# Patient Record
Sex: MALE | Race: WHITE | NOT HISPANIC OR LATINO | Employment: FULL TIME | URBAN - METROPOLITAN AREA
[De-identification: names, ages, dates, MRNs, and addresses within clinical notes are randomized per-mention and may not be internally consistent; named-entity substitution may affect disease eponyms.]

---

## 2017-03-15 ENCOUNTER — HOSPITAL ENCOUNTER (OUTPATIENT)
Dept: INFUSION THERAPY | Facility: HOSPITAL | Age: 52
Discharge: HOME | End: 2017-03-15
Attending: EMERGENCY MEDICINE | Admitting: EMERGENCY MEDICINE

## 2018-03-07 ENCOUNTER — HOSPITAL ENCOUNTER (OUTPATIENT)
Dept: ONCOLOGY | Facility: CLINIC | Age: 53
Setting detail: INFUSION SERIES
Discharge: HOME | End: 2018-03-07
Attending: INTERNAL MEDICINE | Admitting: INTERNAL MEDICINE

## 2018-03-07 ENCOUNTER — CLINICAL SUPPORT (OUTPATIENT)
Dept: ONCOLOGY | Facility: HOSPITAL | Age: 53
End: 2018-03-07

## 2018-03-07 NOTE — PROGRESS NOTES
PATIENTS ONCOLOGY RECORD LOCATED IN [facility]      Subjective     Name:  BUSTER ALARCON     Date:  2018  Address:  [address] [place] [address]  Home: [phone]  :  1965 AGE:  52 y.o.        RECORDS OBTAINED:  Patients Oncology Record [facility] located [facility]

## 2018-06-13 ENCOUNTER — HOSPITAL ENCOUNTER (OUTPATIENT)
Dept: ONCOLOGY | Facility: CLINIC | Age: 53
Setting detail: INFUSION SERIES
Discharge: HOME | End: 2018-06-13
Attending: INTERNAL MEDICINE | Admitting: INTERNAL MEDICINE

## 2018-06-13 ENCOUNTER — CLINICAL SUPPORT (OUTPATIENT)
Dept: ONCOLOGY | Facility: HOSPITAL | Age: 53
End: 2018-06-13

## 2018-06-13 NOTE — PROGRESS NOTES
PATIENTS ONCOLOGY RECORD LOCATED IN [facility]      Subjective     Name:  BUSTER ALARCON     Date:  2018  Address:  [address] [place] [address]  Home: [phone]  :  1965 AGE:  53 y.o.        RECORDS OBTAINED:  Patients Oncology Record is located in [facility]

## 2018-09-13 ENCOUNTER — HOSPITAL ENCOUNTER (OUTPATIENT)
Dept: NUCLEAR MEDICINE | Facility: HOSPITAL | Age: 53
Discharge: HOME | End: 2018-09-13
Attending: INTERNAL MEDICINE | Admitting: INTERNAL MEDICINE

## 2018-10-03 ENCOUNTER — OUTSIDE FACILITY SERVICE (OUTPATIENT)
Dept: CARDIAC SURGERY | Facility: CLINIC | Age: 53
End: 2018-10-03

## 2018-10-03 PROCEDURE — 33533 CABG ARTERIAL SINGLE: CPT | Performed by: THORACIC SURGERY (CARDIOTHORACIC VASCULAR SURGERY)

## 2018-10-03 PROCEDURE — 33508 ENDOSCOPIC VEIN HARVEST: CPT | Performed by: THORACIC SURGERY (CARDIOTHORACIC VASCULAR SURGERY)

## 2018-10-03 PROCEDURE — 33508 ENDOSCOPIC VEIN HARVEST: CPT | Performed by: PHYSICIAN ASSISTANT

## 2018-10-03 PROCEDURE — 33519 CABG ARTERY-VEIN THREE: CPT | Performed by: PHYSICIAN ASSISTANT

## 2018-10-03 PROCEDURE — 33519 CABG ARTERY-VEIN THREE: CPT | Performed by: THORACIC SURGERY (CARDIOTHORACIC VASCULAR SURGERY)

## 2018-10-03 PROCEDURE — 33533 CABG ARTERIAL SINGLE: CPT | Performed by: PHYSICIAN ASSISTANT

## 2018-10-17 ENCOUNTER — TELEPHONE (OUTPATIENT)
Dept: CARDIAC SURGERY | Facility: CLINIC | Age: 53
End: 2018-10-17

## 2018-10-17 NOTE — TELEPHONE ENCOUNTER
"Pt called asking about what his hgb/hct is--those numbers relayed to him.  He is concerned that he is \"cold.\"  We discussed the season change.  He denies any c/o fever, chills, and malaise.    He wants to make sure he should be dressing himself and showering himself.  He has still been sleeping in the chair.  We discussed ADLs and how to get from lying to sitting to standing position with sternal precautions.    He has seen Dr. Hamlin and states that Dr. Hamlin said cardiac rehab in 30 days.    He has an appt on 11/12/2018.  "

## 2018-10-17 NOTE — TELEPHONE ENCOUNTER
Pt called and states that he had a CABG procedure done 10/3/18 and has some general questions that he wanted to speak to the ARNP about. He says he has appeared to be more pale, feeling more cold, more weight loss and has some other questions for you. He would like a call back to discuss his concerns. I told pt I would have the nurse give him a call back. Please let me know when you speak with him. Thanks!

## 2018-10-29 ENCOUNTER — TELEPHONE (OUTPATIENT)
Dept: CARDIAC SURGERY | Facility: CLINIC | Age: 53
End: 2018-10-29

## 2018-11-12 ENCOUNTER — OFFICE VISIT (OUTPATIENT)
Dept: CARDIAC SURGERY | Facility: CLINIC | Age: 53
End: 2018-11-12

## 2018-11-12 VITALS
DIASTOLIC BLOOD PRESSURE: 80 MMHG | BODY MASS INDEX: 24.19 KG/M2 | SYSTOLIC BLOOD PRESSURE: 123 MMHG | TEMPERATURE: 97.5 F | OXYGEN SATURATION: 98 % | RESPIRATION RATE: 20 BRPM | HEIGHT: 72 IN | HEART RATE: 60 BPM | WEIGHT: 178.6 LBS

## 2018-11-12 DIAGNOSIS — Z09 FOLLOW UP: Primary | ICD-10-CM

## 2018-11-12 PROCEDURE — 99024 POSTOP FOLLOW-UP VISIT: CPT | Performed by: NURSE PRACTITIONER

## 2018-11-12 RX ORDER — BUMETANIDE 1 MG/1
1 TABLET ORAL DAILY
Refills: 0 | COMMUNITY
Start: 2018-10-07 | End: 2018-11-12

## 2018-11-12 RX ORDER — NITROGLYCERIN 0.4 MG/1
TABLET SUBLINGUAL
Refills: 0 | COMMUNITY
Start: 2018-09-20 | End: 2018-11-12

## 2018-11-12 RX ORDER — CLOPIDOGREL BISULFATE 75 MG/1
75 TABLET ORAL DAILY
Refills: 1 | COMMUNITY
Start: 2018-11-05 | End: 2019-08-05 | Stop reason: SDUPTHER

## 2018-11-12 RX ORDER — ATORVASTATIN CALCIUM 10 MG/1
10 TABLET, FILM COATED ORAL
Refills: 3 | COMMUNITY
Start: 2018-10-30 | End: 2019-10-01

## 2018-11-12 RX ORDER — UBIDECARENONE 100 MG
100 CAPSULE ORAL DAILY
COMMUNITY
End: 2020-04-22

## 2018-11-12 RX ORDER — RANITIDINE 150 MG/1
TABLET ORAL
Refills: 0 | COMMUNITY
Start: 2018-10-02 | End: 2018-11-12 | Stop reason: ALTCHOICE

## 2018-11-12 RX ORDER — AMIODARONE HYDROCHLORIDE 200 MG/1
TABLET ORAL
Refills: 1 | COMMUNITY
Start: 2018-11-05 | End: 2019-06-17

## 2018-11-12 RX ORDER — TESTOSTERONE CYPIONATE 100 MG/ML
1 INJECTION, SOLUTION INTRAMUSCULAR
COMMUNITY
End: 2018-11-12 | Stop reason: ALTCHOICE

## 2018-11-12 RX ORDER — POTASSIUM CHLORIDE 750 MG/1
10 CAPSULE, EXTENDED RELEASE ORAL
COMMUNITY
End: 2018-11-12

## 2018-11-12 RX ORDER — ASPIRIN 325 MG
81 TABLET ORAL
COMMUNITY
End: 2018-11-12 | Stop reason: ALTCHOICE

## 2018-11-12 RX ORDER — VALSARTAN 320 MG/1
TABLET ORAL
COMMUNITY
Start: 2018-09-25 | End: 2018-11-12 | Stop reason: ALTCHOICE

## 2018-11-12 RX ORDER — TELMISARTAN 80 MG/1
80 TABLET ORAL DAILY
Refills: 5 | COMMUNITY
Start: 2018-08-23 | End: 2018-11-12 | Stop reason: ALTCHOICE

## 2018-11-12 RX ORDER — PANTOPRAZOLE SODIUM 40 MG/1
40 TABLET, DELAYED RELEASE ORAL DAILY
Refills: 5 | COMMUNITY
Start: 2018-09-25 | End: 2018-11-12 | Stop reason: ALTCHOICE

## 2018-11-12 RX ORDER — AMIODARONE HYDROCHLORIDE 400 MG/1
TABLET ORAL
Refills: 0 | COMMUNITY
Start: 2018-10-07 | End: 2018-11-12 | Stop reason: ALTCHOICE

## 2018-11-13 PROBLEM — I25.10 CAD (CORONARY ARTERY DISEASE): Status: ACTIVE | Noted: 2018-11-13

## 2018-11-13 PROBLEM — I48.91 ATRIAL FIBRILLATION (HCC): Status: ACTIVE | Noted: 2018-11-13

## 2018-11-13 PROBLEM — K21.9 GERD (GASTROESOPHAGEAL REFLUX DISEASE): Status: ACTIVE | Noted: 2018-11-13

## 2018-11-13 NOTE — PROGRESS NOTES
"CARDIOVASCULAR SURGERY FOLLOW-UP PROGRESS NOTE  Chief Complaint: Post-op Follow Up        HPI:   Dear Clarissa Angela MD and colleagues:    It was nice to see Emmanuel Carpenter in follow up today after cardiac surgery.  As you know, he is a 53 y.o. male with CAD, afib who underwent CABG at [facility] by Dr. Farris on 10/3/18. He did well postoperatively and continues to do well. He was readmitted after discharge after his cabg d/t afib. He is in nsr today and continues to take his Amiodarone and beta blocker. He comes in today complaining of nothing.  His activity level has been good. He states he is walking an hour a day at home. He is waiting on insurance approval to begin cardiac rehab. He has no complaints.     Physical Exam:         /80 (BP Location: Right arm, Patient Position: Sitting)   Pulse 60   Temp 97.5 °F (36.4 °C) (Oral)   Resp 20   Ht 182.9 cm (72\")   Wt 81 kg (178 lb 9.6 oz)   SpO2 98%   BMI 24.22 kg/m²   Heart:  regular rate and rhythm, S1, S2 normal, no murmur, click, rub or gallop  Lungs:  clear to auscultation bilaterally  Extremities:  no edema  Incision(s):  mid chest healing well, no significant drainage, no dehiscence, no significant erythema, right leg healing well, no significant drainage, no dehiscence, no significant erythema    Assessment/Plan:     S/P CABG. Overall, he is doing well.    No significant post-op complications    OK to begin cardiac rehab  Follow-up as scheduled with cardiology  Follow-up as scheduled with PCP    No restrictions of activity.      Thank you for allowing me to participate in the care of your patient.    Regards,  LUIS MANUEL PARRA      "

## 2019-06-17 ENCOUNTER — OFFICE VISIT (OUTPATIENT)
Dept: CARDIOLOGY | Facility: CLINIC | Age: 54
End: 2019-06-17

## 2019-06-17 VITALS
HEART RATE: 72 BPM | DIASTOLIC BLOOD PRESSURE: 82 MMHG | HEIGHT: 72 IN | BODY MASS INDEX: 24.38 KG/M2 | SYSTOLIC BLOOD PRESSURE: 126 MMHG | WEIGHT: 180 LBS

## 2019-06-17 DIAGNOSIS — I10 ESSENTIAL HYPERTENSION: ICD-10-CM

## 2019-06-17 DIAGNOSIS — E78.49 OTHER HYPERLIPIDEMIA: ICD-10-CM

## 2019-06-17 DIAGNOSIS — I25.10 CORONARY ARTERY DISEASE INVOLVING NATIVE CORONARY ARTERY OF NATIVE HEART WITHOUT ANGINA PECTORIS: Primary | ICD-10-CM

## 2019-06-17 DIAGNOSIS — I48.0 PAROXYSMAL ATRIAL FIBRILLATION (HCC): ICD-10-CM

## 2019-06-17 PROCEDURE — 99214 OFFICE O/P EST MOD 30 MIN: CPT | Performed by: INTERNAL MEDICINE

## 2019-06-17 RX ORDER — PRAVASTATIN SODIUM 80 MG/1
80 TABLET ORAL
Refills: 3 | COMMUNITY
Start: 2019-06-06 | End: 2020-05-08 | Stop reason: SDUPTHER

## 2019-06-17 RX ORDER — DOXYCYCLINE HYCLATE 100 MG/1
100 CAPSULE ORAL 2 TIMES DAILY
Refills: 0 | COMMUNITY
Start: 2019-06-12 | End: 2019-10-01

## 2019-06-17 RX ORDER — RANITIDINE 150 MG/1
150 TABLET ORAL NIGHTLY
Refills: 0 | COMMUNITY
Start: 2019-04-05 | End: 2020-02-12

## 2019-06-17 RX ORDER — TESTOSTERONE CYPIONATE 50 MG/ML
VIAL (ML) INTRAMUSCULAR
COMMUNITY
Start: 2018-03-29 | End: 2020-02-12

## 2019-06-17 RX ORDER — CLOPIDOGREL BISULFATE 75 MG/1
TABLET ORAL EVERY 24 HOURS
COMMUNITY
Start: 2018-10-12 | End: 2019-06-17

## 2019-06-17 NOTE — PROGRESS NOTES
Date of Office Visit: 2019  Encounter Provider: Timo Hamlin MD  Place of Service: [facility] CARDIOLOGY [place]  Patient Name: Emmanuel Carpenter  :1965  Clarissa Chadwick MD    Chief Complaint   Patient presents with   • Hypertension     CAD     CABG     Hyperlipidemia     Hypertension     6 month follow up     History of Present Illness:    I am pleased to see Mr. Carpenter in my office today as a follow-up.    As you know, patient is 54 years old white gentleman whose past medical history is significant for hypertension, hyperlipidemia, CAD, CABG, who came today for follow-up.    In 2018, patient was referred to me for symptom of angina pectoris.  Patient underwent stress test which was abnormal.  Patient underwent cardiac catheterization which showed three-vessel coronary artery disease with significant left main stenosis.  Patient underwent CABG x4.    Since the previous visit, patient is doing fairly well from cardiovascular standpoint.  Patient is very active.  He is involved in physical therapy.  He walks for 30 minutes on treadmill and bicycle for another 30 to 40 minutes.  Patient does not reproduce any symptoms of angina pectoris or congestive heart failure.  Patient denies any orthopnea, PND, syncope or presyncope.  No leg edema noted.  No palpitation.  No recurrence of atrial fibrillation.    Overall I am very pleased with the patient progress.  I will shall continue current treatment.  However, I recommend    Discontinue amiodarone  Continue Plavix and discontinue aspirin  Monitor the blood pressure at home  Continue aerobic activities  Diet modification discussed  I intend to see the patient in 9 months.          Past Medical History:   Diagnosis Date   • Acute kidney injury (CMS/HCC) 10/03/2018    Consulted by Dr. Nunez   • Aortic regurgitation    • Aortic stenosis 10/02/2018    L Main Noted on Cardiac Cath   • Atrial fibrillation, chronic (CMS/HCC)     S/p Ablation  in 2006   • Chest pain 10/01/2018    w/SOB   • Clogged artery (heart) 10/02/2018    R @ 100% Occlusion Noted on Cardiac Cath   • Family history of heart disease     Enlarged Hearts & Valve Replacements   • History of chest x-ray 10/5/18-F    Stable but Small L Apical Pneumothorax   • History of echocardiogram 03/23/16-F    Mild Concentrive L Ventricular Hypertrophy; Moderate Aortic Reg; Mild Mitral Reg   • HLD (hyperlipidemia)    • Hx of chest x-ray 10/3/18-F    Small L Apical Pneumothorax    • Hypertension    • Hypogonadism in male    • Hypokalemia    • Mild concentric left ventricular hypertrophy 03/23/2016    Noted on Echo   • Pneumothorax 10/05/2018    Small Noted on Chest XR   • SVT (supraventricular tachycardia) (CMS/HCC) Hx    S/p Ablation in 2006         Past Surgical History:   Procedure Laterality Date   • APPENDECTOMY  1990   • CARDIAC ABLATION  2006   • CARDIAC CATHETERIZATION Left 10/2/18-[facility]    w/Arteriography/Angiography-Dr. Hamlin--100% Occulsion of RCA; Significant L Main Stenosis   • CHOLECYSTECTOMY  2004   • CORONARY ARTERY BYPASS GRAFT  10/03/2018    urgent X4   Dr Farris           Current Outpatient Medications:   •  clopidogrel (PLAVIX) 75 MG tablet, Take 75 mg by mouth Daily., Disp: , Rfl: 1  •  Coenzyme Q10 10 MG capsule, CO Q 10 CAPS, Disp: , Rfl:   •  doxycycline (VIBRAMYCIN) 100 MG capsule, Take 100 mg by mouth 2 (Two) Times a Day., Disp: , Rfl: 0  •  metoprolol tartrate (LOPRESSOR) 25 MG tablet, 25 mg Every 12 (Twelve) Hours., Disp: , Rfl:   •  pravastatin (PRAVACHOL) 80 MG tablet, Take 80 mg by mouth every night at bedtime., Disp: , Rfl: 3  •  raNITIdine (ZANTAC) 150 MG tablet, , Disp: , Rfl: 0  •  Testosterone Cypionate 50 MG/ML solution, EVERY OTHER DAY, Disp: , Rfl:   •  atorvastatin (LIPITOR) 10 MG tablet, Take 10 mg by mouth every night at bedtime., Disp: , Rfl: 3  •  coenzyme Q10 100 MG capsule, Take 100 mg by mouth Daily., Disp: , Rfl:   •  Copper Gluconate (COPPER CAPS) 2 MG  "capsule, Take  by mouth., Disp: , Rfl:       Social History     Socioeconomic History   • Marital status:      Spouse name: Fely   • Number of children: Not on file   • Years of education: Not on file   • Highest education level: Not on file   Tobacco Use   • Smoking status: Never Smoker   • Smokeless tobacco: Never Used   Substance and Sexual Activity   • Alcohol use: No   • Drug use: No   • Sexual activity: Defer         Review of Systems   Constitution: Negative for decreased appetite and fever.   HENT: Negative for ear pain.    Eyes: Negative for discharge.   Cardiovascular: Negative for chest pain, palpitations and syncope.   Respiratory: Negative for cough and shortness of breath.    Endocrine: Negative for cold intolerance.   Musculoskeletal: Negative for arthritis and back pain.   Gastrointestinal: Negative for bloating and abdominal pain.   Genitourinary: Negative for frequency.   Neurological: Negative for light-headedness and numbness.   Psychiatric/Behavioral: Negative for altered mental status.       Procedures        Objective:    /82   Pulse 72   Ht 182.9 cm (72\")   Wt 81.6 kg (180 lb)   BMI 24.41 kg/m²         Physical Exam   Constitutional: He is oriented to person, place, and time. He appears well-developed and well-nourished.   HENT:   Head: Normocephalic and atraumatic.   Eyes: No scleral icterus.   Neck: Neck supple. No JVD present. No thyromegaly present.   Cardiovascular: Normal rate, regular rhythm and normal heart sounds.   Pulmonary/Chest: No stridor. No respiratory distress. He has no wheezes. He has no rales.   Abdominal: He exhibits no distension. There is no tenderness.   Musculoskeletal: He exhibits no edema.   Lymphadenopathy:     He has no cervical adenopathy.   Neurological: He is alert and oriented to person, place, and time.   Skin: No rash noted.   Psychiatric: He has a normal mood and affect.           Assessment:       Diagnosis Plan   1. Coronary artery " disease involving native coronary artery of native heart without angina pectoris     2. Paroxysmal atrial fibrillation (CMS/HCC)     3. Essential hypertension     4. Other hyperlipidemia              Plan:       Discontinue amiodarone  Continue Plavix and discontinue aspirin  Monitor the blood pressure at home  Continue aerobic activities  Diet modification discussed  I intend to see the patient in 9 months.

## 2019-06-17 NOTE — PROGRESS NOTES
Visit Type:  Follow-up Visit  Referring Provider:  Patricia bermeo   Primary Provider:  Clarissa Chadwick MD    CC:  6 MONTH FOLLOW UP  History of Present Illness:  I am pleased to see  in my office today  as a follow-up    As you know, patient is 53-year-old white gentleman whose past medical history significant for hypertension who came today for follow-up    In August 2018, patient was referred to me for symptom of angina pectoris.  Patient underwent stress test which was abnormal.  Patient underwent cardiac catheterization which showed three-vessel coronary artery disease with significant left main stenosis.    Patient underwent CABG x4.    Since the previous visit, patient is overall stable.  Patient stop amiodarone because of insomnia.  He also decrease his Lopressor to once a day for unknown reason.  Patient does not report any further episode of palpitation or recurrence of atrial   fibrillation.  Patient denies any chest pain.  Patient is doing cardiac rehab and does not reproduced  signs and symptom of angina pectoris or congestive heart failure.  No palpitation.    At this stage I would recommend to increase Lopressor to 25 mg twice a day.  I would continue to discontinue amiodarone.  I intend to see the patient      Past Medical History:     Reviewed history from 10/12/2018 and no changes required:        Hypogondism        No Drug Allergies?         cataracts        Coronary Heart Disease /Cabg 4Vessel 10/3/2018    Past Surgical History:     Reviewed history from 10/12/2018 and no changes required:        RFA Heart Proceudre        Appendectomy-1990        Cholecystectomy        Cataract Extraction detatched retina        CABG 10/3/2018 4Vessel    Current Allergies (reviewed today):  * ADULT BOOSTER (Critical)    Current Medications (including medications started today):   [employer] RED YEAST RICE CAPSULE (RED YEAST RICE EXTRACT CAPS) qd  CLOPIDOGREL BISULFATE 75 MG ORAL TABLET (CLOPIDOGREL  BISULFATE) Take 1 tablet by mouth daily  METOPROLOL TARTRATE 25 MG ORAL TABLET (METOPROLOL TARTRATE) Take one (1) tablet by mouth daily  CO Q 10 CAPSULE (COENZYME Q10 CAPS)   * HCG 280IU Injeced every other day  TESTOSTERONE CYPIONATE SOLUTION (TESTOSTERONE CYPIONATE SOLN) 40 mg injection every other day    Family History Summary:      Reviewed history Last on 10/12/2018 and no changes required:12/10/2018  Father - Has Family History of Other Cancer - Entered On: 3/29/2018  Mother - Has Family History of Hypertension - Entered On: 3/29/2018    General Comments - FH:  FH Diabetes-MGM  Prostate Cancer-PGF  Father DM     Social History:     Reviewed history from 2013 and no changes required:        Marital Status:         Children: 4        Occupation:         Marital Status:         Children:         Occupation:           Review of Systems   General: No fatigue or tiredness  Eyes: No redness  Ear/Nose/Throat: No discharge  Cardiovascular: No chest pain  Respiratory: No shortness of breath  Gastrointestinal: No nausea or vomiting  Genitourinary: No Bleeding  Musculoskeletal: No arthralgia or myalgia  Skin: No rash  Neurologic: No numbness or tingling  Psychiatric: No anxiety or depression  Hematologic/Lymphatic: No abnormal bleeding      Vital Signs:    Patient Profile:    53 Years Old Male  Height:     71 inches  Weight:     180 pounds  BMI:        25.10     Pulse rate: 79 / minute  BP Sittin / 85        Problems: Active problems were reviewed with the patient during this visit.  Medications: Medications were reviewed with the patient during this visit.  Allergies: Allergies were reviewed with the patient during this visit.          Physical Exam    General:      well developed, well nourished, in no acute distress.    Head:      normocephalic and atraumatic.    Neck:      no masses, thyromegaly, or abnormal cervical nodes.    Chest Wall:       chest wall incision is healing well  Lungs:       clear bilaterally to auscultation.    Heart:      non-displaced PMI, chest non-tender; regular rate and rhythm, S1, S2 without murmurs, rubs, or gallops  Abdomen:       normal bowel sounds; no hepatosplenomegaly   Genitalia:      Testes BL 18-20 ml.   Pulses:      pulses normal in upper extremities.    Extremities:      no clubbing, cyanosis, edema   Neurologic:      no focal deficits   Skin:      intact without lesions or rashes.    Inguinal Nodes:      no significant adenopathy.      Diabetes Management Exam:      Foot Exam (with socks and/or shoes not present):        Pulses:           pulses normal in upper extremities.        Blood Pressure:  Today's BP: 153/85 mm Hg    Labwork:   Most Recent Lab Results:   LDL: 103 mg/dL 03/22/2013        Impression & Recommendations:    Problem # 1:  Coronary Heart Disease (ICD-414.05) (TKN93-E27.810)  Overall patient is stable from coronary standpoint. Patient denies any symptoms of active angina pectoris.  I shall not recommend any change in the current regimen. I recommended to increase the aerobic activities as tolerated.  Diet modification is discussed.    The following medications were removed from the medication list:     Adult Aspirin Ec Low Strength 81 Mg Oral Tablet Delayed Release (Aspirin) ..... Take 1 tablet by mouth daily    His updated medication list for this problem includes:     Clopidogrel Bisulfate 75 Mg Oral Tablet (Clopidogrel bisulfate) ..... Take 1 tablet by mouth daily     Metoprolol Tartrate 25 Mg Oral Tablet (Metoprolol tartrate) ..... Take one (1) tablet by mouth daily    Orders:  [facility] Vst, Est Level III (25111)      Problem # 2:  ATRIAL FIBRILLATION (ICD-427.31) (ZDY51-H17.0)   no further episode of atrial fibrillation.  Discontinue amiodarone  The following medications were removed from the medication list:     Adult Aspirin Ec Low Strength 81 Mg Oral Tablet Delayed Release (Aspirin) ..... Take 1 tablet by mouth daily     Amiodarone Hcl 200 Mg  Oral Tablet (Amiodarone hcl) ..... Take one (1) tablet by mouth twice a day    His updated medication list for this problem includes:     Clopidogrel Bisulfate 75 Mg Oral Tablet (Clopidogrel bisulfate) ..... Take 1 tablet by mouth daily     Metoprolol Tartrate 25 Mg Oral Tablet (Metoprolol tartrate) ..... Take one (1) tablet by mouth daily    Orders:  Ofc Vst, Est Level III (58455)      Problem # 3:  Hypertension (ICD-401.9) ([id number])  patient's blood pressure is within desirable range. At this time, I would not recommend any change in antihypertensive regimen. However, patient is advised to check the blood pressure periodically at home and bring the logbook on next visit. Patient is   also encouraged to increase aerobic activities as tolerated. Risk factor modification is discussed.    The following medications were removed from the medication list:     Adult Aspirin Ec Low Strength 81 Mg Oral Tablet Delayed Release (Aspirin) ..... Take 1 tablet by mouth daily    His updated medication list for this problem includes:     Metoprolol Tartrate 25 Mg Oral Tablet (Metoprolol tartrate) ..... Take one (1) tablet by mouth daily    Orders:  Ofc Vst, Est Level III (32193)      Medications Added to Medication List This Visit:  1)  [employer] Red Yeast Rice Capsule (Red yeast rice extract caps) .... Qd  2)  Metoprolol Tartrate 25 Mg Oral Tablet (Metoprolol tartrate) .... Take one (1) tablet by mouth daily      Patient Instructions:  1)  Advised to reduce the total intake of fats, especially to choose foods that are low in saturated fat.  2)  Limit intake of Sodium (Salt).  3)  Discussed importance of regular exercise and recommended starting or continuing a regular exercise program for good health.  4)  The patient was encouraged to lose weight for better health.                Medication Administration    Orders Added:  1)  Ofc Vst, Est Level III [99369]  ]      Electronically signed by Timo Hamlin MD on 12/10/2018 at 11:14  AM  ________________________________________________________________________       Disclaimer: Converted Note message may not contain all data elements that existed in the legacy source system. Please see [facility] Legacy System for the original note details.

## 2019-06-28 NOTE — PROCEDURES
anam rpt : cardiac science      Imported By: Shirley Lehman 6/28/2019 12:05:21 PM    _____________________________________________________________________    External Attachment:      Type: Image      Comment:  External Document      Signed before import by Timo Hamlin MD  Filed automatically on 06/28/2019 at 12:05 PM  ________________________________________________________________________       Disclaimer: Converted Note message may not contain all data elements that existed in the legacy source system. Please see [facility] Legacy System for the original note details.

## 2019-08-06 RX ORDER — CLOPIDOGREL BISULFATE 75 MG/1
TABLET ORAL
Qty: 90 TABLET | Refills: 3 | Status: SHIPPED | OUTPATIENT
Start: 2019-08-06 | End: 2019-12-10

## 2019-09-26 PROBLEM — R07.9 CHEST PAIN: Status: ACTIVE | Noted: 2018-08-29

## 2019-09-26 PROBLEM — R06.02 SHORTNESS OF BREATH: Status: ACTIVE | Noted: 2018-08-29

## 2019-10-01 ENCOUNTER — OFFICE VISIT (OUTPATIENT)
Dept: CARDIOLOGY | Facility: CLINIC | Age: 54
End: 2019-10-01

## 2019-10-01 VITALS
WEIGHT: 181 LBS | HEART RATE: 72 BPM | BODY MASS INDEX: 24.52 KG/M2 | HEIGHT: 72 IN | SYSTOLIC BLOOD PRESSURE: 150 MMHG | DIASTOLIC BLOOD PRESSURE: 84 MMHG

## 2019-10-01 DIAGNOSIS — I25.10 CORONARY ARTERY DISEASE INVOLVING NATIVE CORONARY ARTERY OF NATIVE HEART WITHOUT ANGINA PECTORIS: ICD-10-CM

## 2019-10-01 DIAGNOSIS — I10 ESSENTIAL HYPERTENSION: Primary | ICD-10-CM

## 2019-10-01 DIAGNOSIS — I48.0 PAROXYSMAL ATRIAL FIBRILLATION (HCC): ICD-10-CM

## 2019-10-01 DIAGNOSIS — E78.49 OTHER HYPERLIPIDEMIA: ICD-10-CM

## 2019-10-01 PROCEDURE — 99213 OFFICE O/P EST LOW 20 MIN: CPT | Performed by: NURSE PRACTITIONER

## 2019-10-01 PROCEDURE — 93000 ELECTROCARDIOGRAM COMPLETE: CPT | Performed by: NURSE PRACTITIONER

## 2019-10-01 RX ORDER — CARVEDILOL 12.5 MG/1
12.5 TABLET ORAL 2 TIMES DAILY
Qty: 60 TABLET | Refills: 11 | Status: SHIPPED | OUTPATIENT
Start: 2019-10-01 | End: 2019-10-16 | Stop reason: SDUPTHER

## 2019-10-01 RX ORDER — ROSUVASTATIN CALCIUM 40 MG/1
40 TABLET, COATED ORAL
Refills: 0 | COMMUNITY
Start: 2019-08-12 | End: 2019-10-01

## 2019-10-02 PROBLEM — I48.0 PAROXYSMAL ATRIAL FIBRILLATION: Status: ACTIVE | Noted: 2018-11-13

## 2019-10-02 NOTE — PROGRESS NOTES
Cardiology Office Follow Up Visit      Primary Care Provider:  Clarissa Chadwick MD    Reason for f/u:     Coronary artery disease  Status post CABG  Proximal Atrial Fibrillation  Hypertension      Subjective     CC:    Denies chest pain or dyspnea    History of Present Illness       Emmanuel Carpenter is a 54 y.o. male.  He is here here today for earlier than scheduled follow-up due to reports of recent elevated blood pressure.  I reviewed his log of blood pressures from home cardiac rehab and he has been noticing that his systolic pressures have been running in the 140s and diastolic have been staying over 80s at times.    The patient was evaluated for chest pain back in August 2018 and underwent cardiac catheterization which showed three-vessel coronary artery disease and significant left main stenosis.  He underwent four-vessel CABG.  Postoperatively he did have atrial fibrillation.    Over the last year his amiodarone has been discontinued he hasmaintained sinus rhythm and has been managed on medical therapy.  He has  been actively participating in cardiac rehab.  He has had no recurrent chest pain or dyspnea.  He had no PND orthopnea palpitations near syncope lower extremity or feelings of his heart racing he is been compliant with medical therapy.    He is concerned about recent increasing blood pressures and also interested in considering a change in his metoprolol to carvedilol.        Past Medical History:   Diagnosis Date   • Acute kidney injury (CMS/HCC) 10/03/2018    Consulted by Dr. Nunez   • Aortic regurgitation 2016   • Aortic stenosis 10/02/2018    L Main Noted on Cardiac Cath   • Atrial fibrillation, chronic     S/p Ablation in 2006   • Chest pain 10/01/2018    w/SOB   • Clogged artery (heart) 10/02/2018    R @ 100% Occlusion Noted on Cardiac Cath   • Family history of heart disease     Enlarged Hearts & Valve Replacements   • History of chest x-ray 10/5/18-[facility]    Stable but Small L Apical  Pneumothorax   • History of echocardiogram 03/23/16-[facility]    Mild Concentrive L Ventricular Hypertrophy; Moderate Aortic Reg; Mild Mitral Reg   • HLD (hyperlipidemia)    • Hx of chest x-ray 10/3/18-[facility]    Small L Apical Pneumothorax    • Hypertension    • Hypogonadism in male    • Hypokalemia    • Mild concentric left ventricular hypertrophy 03/23/2016    Noted on Echo   • Pneumothorax 10/05/2018    Small Noted on Chest XR   • SVT (supraventricular tachycardia) (CMS/HCC) Hx    S/p Ablation in 2006       Past Surgical History:   Procedure Laterality Date   • APPENDECTOMY  1990   • CARDIAC ABLATION  2006   • CARDIAC CATHETERIZATION Left 10/2/18-[facility]    w/Arteriography/Angiography-Dr. Hamlin--100% Occulsion of RCA; Significant L Main Stenosis   • CHOLECYSTECTOMY  2004   • CORONARY ARTERY BYPASS GRAFT  10/03/2018    urgent X4   Dr Farris         Current Outpatient Medications:   •  Chorionic Gonadotropin (HCG IJ), Inject 280 mL as directed Every Other Day., Disp: , Rfl:   •  clopidogrel (PLAVIX) 75 MG tablet, Take 1 tablet by mouth daily, Disp: 90 tablet, Rfl: 3  •  coenzyme Q10 100 MG capsule, Take 100 mg by mouth Daily., Disp: , Rfl:   •  Copper Gluconate (COPPER CAPS) 2 MG capsule, Take  by mouth., Disp: , Rfl:   •  pravastatin (PRAVACHOL) 80 MG tablet, Take 80 mg by mouth every night at bedtime., Disp: , Rfl: 3  •  raNITIdine (ZANTAC) 150 MG tablet, Every Night., Disp: , Rfl: 0  •  Testosterone Cypionate 50 MG/ML solution, EVERY OTHER DAY, Disp: , Rfl:   •  carvedilol (COREG) 12.5 MG tablet, Take 1 tablet by mouth 2 (Two) Times a Day., Disp: 60 tablet, Rfl: 11    Social History     Socioeconomic History   • Marital status:      Spouse name: Fely   • Number of children: Not on file   • Years of education: Not on file   • Highest education level: Not on file   Tobacco Use   • Smoking status: Never Smoker   • Smokeless tobacco: Never Used   Substance and Sexual Activity   • Alcohol use: No   • Drug use: No   •  "Sexual activity: Defer       Family History   Problem Relation Age of Onset   • Heart defect Maternal Grandmother         Enlarged Heart       The following portions of the patient's history were reviewed and updated as appropriate: allergies, current medications, past family history, past medical history, past social history, past surgical history and problem list.    Review of Systems   Constitution: Negative for decreased appetite, diaphoresis and weakness.   HENT: Negative for congestion, hearing loss and nosebleeds.    Cardiovascular: Negative for chest pain, claudication, dyspnea on exertion, irregular heartbeat, leg swelling, near-syncope, orthopnea, palpitations, paroxysmal nocturnal dyspnea and syncope.   Respiratory: Negative for cough, shortness of breath and sleep disturbances due to breathing.    Endocrine: Negative for polyuria.   Hematologic/Lymphatic: Does not bruise/bleed easily.   Skin: Negative for itching and rash.   Musculoskeletal: Negative for back pain, muscle weakness and myalgias.   Gastrointestinal: Negative for abdominal pain, change in bowel habit and nausea.   Genitourinary: Negative for dysuria, flank pain, frequency and hesitancy.   Neurological: Negative for dizziness and tremors.   Psychiatric/Behavioral: Negative for altered mental status. The patient does not have insomnia.      /84   Pulse 72   Ht 182.9 cm (72\")   Wt 82.1 kg (181 lb)   BMI 24.55 kg/m² .  Objective     Physical Exam   Constitutional: He is oriented to person, place, and time. He appears well-developed and well-nourished. No distress.   HENT:   Head: Normocephalic and atraumatic.   Eyes: Pupils are equal, round, and reactive to light.   Neck: Normal range of motion. Neck supple. No JVD present.   Cardiovascular: Normal rate, regular rhythm, S1 normal, S2 normal, normal heart sounds and intact distal pulses.   No murmur heard.  Pulmonary/Chest: Effort normal and breath sounds normal.   Abdominal: Soft. " Normal appearance. He exhibits no distension. There is no tenderness.   Musculoskeletal: Normal range of motion. He exhibits no edema.   Neurological: He is alert and oriented to person, place, and time.   Skin: Skin is warm and dry.   Psychiatric: He has a normal mood and affect.           ECG 12 Lead  Date/Time: 10/1/2019 2:56 PM  Performed by: Amy Bill APRN  Authorized by: Amy Bill APRN   Comparison: not compared with previous ECG   Previous ECG: no previous ECG available  Rhythm: sinus rhythm  Rate: normal  BPM: 72  Conduction: incomplete right bundle branch block    Clinical impression: abnormal EKG                    Diagnoses and all orders for this visit:    1. Essential hypertension (Primary)  Comments:  Patient's log blood pressures been reviewed has periods of systolic blood pressures in the 140s and diastolics greater than 80s  Orders:  -     ECG 12 Lead  -     carvedilol (COREG) 12.5 MG tablet; Take 1 tablet by mouth 2 (Two) Times a Day.  Dispense: 60 tablet; Refill: 11    2. Coronary artery disease involving native coronary artery of native heart without angina pectoris  Assessment & Plan:  Coronary artery disease is unchanged.  Continue current treatment regimen.  Regular aerobic exercise.  Cardiac status will be reassessed in 6 months.    No signs and symptoms to suggest unstable angina      3. Paroxysmal atrial fibrillation (CMS/HCC)  Assessment & Plan:  Currently in sinus rhythm with no symptomatic recurrence of atrial fibrillation reported      4. Other hyperlipidemia  Assessment & Plan:  Lipid abnormalities are improving with treatment.  Pharmacotherapy as ordered.  Lipids will be reassessed in 6 months     Lipids been assessed by PCP patient reports last LDL cholesterol was 68.        Plan:  We will stop metoprolol  Add carvedilol 12.5 mg twice daily  Patient advised to continue to log his blood pressure and report these to our office in the next week or 2  Patient advised to  contact office if you develop any new or worsening problems  Otherwise he will keep his next scheduled follow-up with Dr. Hamlin

## 2019-10-02 NOTE — ASSESSMENT & PLAN NOTE
Coronary artery disease is unchanged.  Continue current treatment regimen.  Regular aerobic exercise.  Cardiac status will be reassessed in 6 months.    No signs and symptoms to suggest unstable angina

## 2019-10-02 NOTE — ASSESSMENT & PLAN NOTE
Lipid abnormalities are improving with treatment.  Pharmacotherapy as ordered.  Lipids will be reassessed in 6 months     Lipids been assessed by PCP patient reports last LDL cholesterol was 68.

## 2019-10-04 ENCOUNTER — TELEPHONE (OUTPATIENT)
Dept: CARDIOLOGY | Facility: CLINIC | Age: 54
End: 2019-10-04

## 2019-10-04 NOTE — TELEPHONE ENCOUNTER
He was in on the 1st  He was put on carvedilol  12.5  2 x daily  He wants to try the 12.5 3 x daily so far the results are simpler to when he was on the metoprolol 25 mg 2 x daily

## 2019-10-07 NOTE — TELEPHONE ENCOUNTER
Per Dr Hamlin, he would like for him to stay on 12.5mg Carvedilol BID until next week when he sees Amy.

## 2019-10-16 ENCOUNTER — OFFICE VISIT (OUTPATIENT)
Dept: CARDIOLOGY | Facility: CLINIC | Age: 54
End: 2019-10-16

## 2019-10-16 VITALS
SYSTOLIC BLOOD PRESSURE: 162 MMHG | BODY MASS INDEX: 23.59 KG/M2 | WEIGHT: 178 LBS | DIASTOLIC BLOOD PRESSURE: 84 MMHG | HEART RATE: 76 BPM | HEIGHT: 73 IN

## 2019-10-16 DIAGNOSIS — I10 ESSENTIAL HYPERTENSION: ICD-10-CM

## 2019-10-16 DIAGNOSIS — I48.0 PAROXYSMAL ATRIAL FIBRILLATION (HCC): ICD-10-CM

## 2019-10-16 DIAGNOSIS — I25.10 CORONARY ARTERY DISEASE INVOLVING NATIVE CORONARY ARTERY OF NATIVE HEART WITHOUT ANGINA PECTORIS: Primary | ICD-10-CM

## 2019-10-16 PROCEDURE — 93000 ELECTROCARDIOGRAM COMPLETE: CPT | Performed by: NURSE PRACTITIONER

## 2019-10-16 PROCEDURE — 99213 OFFICE O/P EST LOW 20 MIN: CPT | Performed by: NURSE PRACTITIONER

## 2019-10-16 RX ORDER — CARVEDILOL 25 MG/1
25 TABLET ORAL 2 TIMES DAILY
Qty: 60 TABLET | Refills: 11 | Status: SHIPPED | OUTPATIENT
Start: 2019-10-16 | End: 2019-10-18 | Stop reason: SDUPTHER

## 2019-10-18 ENCOUNTER — TELEPHONE (OUTPATIENT)
Dept: CARDIOLOGY | Facility: CLINIC | Age: 54
End: 2019-10-18

## 2019-10-18 DIAGNOSIS — I10 ESSENTIAL HYPERTENSION: ICD-10-CM

## 2019-10-18 RX ORDER — CARVEDILOL 25 MG/1
25 TABLET ORAL 2 TIMES DAILY
Qty: 60 TABLET | Refills: 5 | Status: SHIPPED | OUTPATIENT
Start: 2019-10-18 | End: 2020-10-21

## 2019-10-18 NOTE — TELEPHONE ENCOUNTER
Patient called and asked that his carvedilol 25 mg be called to his pharmacy.  [facility] in [place]

## 2019-10-22 PROBLEM — R07.9 CHEST PAIN: Status: RESOLVED | Noted: 2018-08-29 | Resolved: 2019-10-22

## 2019-10-22 PROBLEM — I10 ESSENTIAL HYPERTENSION: Status: ACTIVE | Noted: 2019-10-22

## 2019-10-22 NOTE — PROGRESS NOTES
Cardiology Office Follow Up Visit      Primary Care Provider:  Clarissa Chadwick MD    Reason for f/u:     HTN      Subjective     CC:    Denies chest pain or dyspnea    History of Present Illness       Emmanuel Carpenter is a 54 y.o. male. Emmanuel Carpenter is a 54 y.o. male.  He is here here today for earlier than scheduled follow-up due to recent reports of recent elevated blood pressure.  He was in office a few weeks ago and we changed his metoprolol to carvedilol.  He presents today to review his blood pressures .  He does report some improvement in his blood pressures and says he is overall feeling better on carvedilol.     The patient was evaluated for chest pain back in August 2018 and underwent cardiac catheterization which showed three-vessel coronary artery disease and significant left main stenosis.  He underwent four-vessel CABG.  Postoperatively he did have atrial fibrillation.     Over the last year his amiodarone has been discontinued he hasmaintained sinus rhythm and has been managed on medical therapy.  He has  been actively participating in cardiac rehab.  He has had no recurrent chest pain or dyspnea.  He had no PND orthopnea palpitations near syncope lower extremity or feelings of his heart racing he is been compliant with medical therapy.              Past Medical History:   Diagnosis Date   • Acute kidney injury (CMS/HCC) 10/03/2018    Consulted by Dr. Nunez   • Aortic regurgitation 2016   • Aortic stenosis 10/02/2018    L Main Noted on Cardiac Cath   • Family history of heart disease     Enlarged Hearts & Valve Replacements   • History of chest x-ray 10/5/18-[facility]    Stable but Small L Apical Pneumothorax   • History of echocardiogram 03/23/16-[facility]    Mild Concentrive L Ventricular Hypertrophy; Moderate Aortic Reg; Mild Mitral Reg   • HLD (hyperlipidemia)    • Hx of chest x-ray 10/3/18-[facility]    Small L Apical Pneumothorax    • Hypertension    • Hypogonadism in male    • Hypokalemia    • Mild  concentric left ventricular hypertrophy 03/23/2016    Noted on Echo   • Pneumothorax 10/05/2018    Small Noted on Chest XR   • SVT (supraventricular tachycardia) (CMS/HCC) Hx    S/p Ablation in 2006       Past Surgical History:   Procedure Laterality Date   • APPENDECTOMY  1990   • CARDIAC ABLATION  2006   • CARDIAC CATHETERIZATION Left 10/2/18-[facility]    w/Arteriography/Angiography-Dr. Hamlin--100% Occulsion of RCA; Significant L Main Stenosis   • CHOLECYSTECTOMY  2004   • CORONARY ARTERY BYPASS GRAFT  10/03/2018    urgent X4   Dr Farris         Current Outpatient Medications:   •  Chorionic Gonadotropin (HCG IJ), Inject 280 mL as directed Every Other Day., Disp: , Rfl:   •  clopidogrel (PLAVIX) 75 MG tablet, Take 1 tablet by mouth daily, Disp: 90 tablet, Rfl: 3  •  coenzyme Q10 100 MG capsule, Take 100 mg by mouth Daily., Disp: , Rfl:   •  Copper Gluconate (COPPER CAPS) 2 MG capsule, Take  by mouth., Disp: , Rfl:   •  pravastatin (PRAVACHOL) 80 MG tablet, Take 80 mg by mouth every night at bedtime., Disp: , Rfl: 3  •  raNITIdine (ZANTAC) 150 MG tablet, Every Night., Disp: , Rfl: 0  •  Testosterone Cypionate 50 MG/ML solution, EVERY OTHER DAY, Disp: , Rfl:   •  carvedilol (COREG) 25 MG tablet, Take 1 tablet by mouth 2 (Two) Times a Day., Disp: 60 tablet, Rfl: 5    Social History     Socioeconomic History   • Marital status:      Spouse name: Fely   • Number of children: Not on file   • Years of education: Not on file   • Highest education level: Not on file   Tobacco Use   • Smoking status: Never Smoker   • Smokeless tobacco: Never Used   Substance and Sexual Activity   • Alcohol use: No   • Drug use: No   • Sexual activity: Defer       Family History   Problem Relation Age of Onset   • Heart defect Maternal Grandmother         Enlarged Heart       The following portions of the patient's history were reviewed and updated as appropriate: allergies, current medications, past family history, past medical history,  "past social history, past surgical history and problem list.    Review of Systems   Constitution: Negative for decreased appetite, diaphoresis and weakness.   HENT: Negative for congestion, hearing loss and nosebleeds.    Cardiovascular: Negative for chest pain, claudication, dyspnea on exertion, irregular heartbeat, leg swelling, near-syncope, orthopnea, palpitations, paroxysmal nocturnal dyspnea and syncope.   Respiratory: Negative for cough, shortness of breath and sleep disturbances due to breathing.    Endocrine: Negative for polyuria.   Hematologic/Lymphatic: Does not bruise/bleed easily.   Skin: Negative for itching and rash.   Musculoskeletal: Negative for back pain, muscle weakness and myalgias.   Gastrointestinal: Negative for abdominal pain, change in bowel habit and nausea.   Genitourinary: Negative for dysuria, flank pain, frequency and hesitancy.   Neurological: Negative for dizziness and tremors.   Psychiatric/Behavioral: Negative for altered mental status. The patient does not have insomnia.      /84   Pulse 76   Ht 185.4 cm (73\")   Wt 80.7 kg (178 lb)   BMI 23.48 kg/m² .  Objective     Physical Exam   Constitutional: He is oriented to person, place, and time. He appears well-developed and well-nourished. No distress.   HENT:   Head: Normocephalic and atraumatic.   Eyes: Pupils are equal, round, and reactive to light.   Neck: Normal range of motion. Neck supple. No JVD present.   Cardiovascular: Normal rate, regular rhythm, S1 normal, S2 normal, normal heart sounds and intact distal pulses.   No murmur heard.  Pulmonary/Chest: Effort normal and breath sounds normal.   Abdominal: Soft. Normal appearance. He exhibits no distension. There is no tenderness.   Musculoskeletal: Normal range of motion. He exhibits no edema.   Neurological: He is alert and oriented to person, place, and time.   Skin: Skin is warm and dry.   Psychiatric: He has a normal mood and affect.           ECG 12 " Lead  Date/Time: 10/16/2019 1:11 PM  Performed by: Amy Bill APRN  Authorized by: Amy Bill APRN   Comparison: compared with previous ECG from 10/1/2019  Similar to previous ECG  Rhythm: sinus rhythm  BPM: 76  Conduction: incomplete right bundle branch block    Clinical impression: abnormal EKG                    Diagnoses and all orders for this visit:    1. Coronary artery disease involving native coronary artery of native heart without angina pectoris (Primary)  Comments:  No chest pain or signs and symptoms to suggest angina    2. Essential hypertension  Comments:  Log blood pressures reviewed numbers improving but still periods of blood pressure over goal.  Orders:  -     ECG 12 Lead  -     Discontinue: carvedilol (COREG) 25 MG tablet; Take 1 tablet by mouth 2 (Two) Times a Day.  Dispense: 60 tablet; Refill: 11    3. Paroxysmal atrial fibrillation (CMS/HCC)  Comments:  Currently maintaining sinus rhythm        Plan increase Coreg to 25 mg twice daily  Continue low blood pressure but is been cardiac rehab  Continue current treatment plan return to clinic for scheduled follow-up in 6 months

## 2019-10-28 ENCOUNTER — OFFICE VISIT (OUTPATIENT)
Dept: CARDIOLOGY | Facility: CLINIC | Age: 54
End: 2019-10-28

## 2019-10-28 VITALS
SYSTOLIC BLOOD PRESSURE: 145 MMHG | WEIGHT: 177 LBS | HEART RATE: 57 BPM | DIASTOLIC BLOOD PRESSURE: 86 MMHG | BODY MASS INDEX: 23.98 KG/M2 | HEIGHT: 72 IN

## 2019-10-28 DIAGNOSIS — I25.10 CORONARY ARTERY DISEASE INVOLVING NATIVE CORONARY ARTERY OF NATIVE HEART WITHOUT ANGINA PECTORIS: Primary | ICD-10-CM

## 2019-10-28 DIAGNOSIS — I83.893 VARICOSE VEINS OF BILATERAL LOWER EXTREMITIES WITH OTHER COMPLICATIONS: ICD-10-CM

## 2019-10-28 DIAGNOSIS — E78.49 OTHER HYPERLIPIDEMIA: ICD-10-CM

## 2019-10-28 DIAGNOSIS — R06.02 SHORTNESS OF BREATH: ICD-10-CM

## 2019-10-28 DIAGNOSIS — I48.0 PAROXYSMAL ATRIAL FIBRILLATION (HCC): ICD-10-CM

## 2019-10-28 DIAGNOSIS — I10 ESSENTIAL HYPERTENSION: ICD-10-CM

## 2019-10-28 PROCEDURE — 99213 OFFICE O/P EST LOW 20 MIN: CPT | Performed by: INTERNAL MEDICINE

## 2019-10-28 NOTE — PROGRESS NOTES
Date of Office Visit: 10/28/2019  Encounter Provider: Dr Timo Hamlin  Place of Service: [facility] CARDIOLOGY [place]  Patient Name: Emmanuel Carpenter  :1965  Clarissa Chadwick MD    Chief Complaint   Patient presents with   • Atrial Fibrillation     fu; medication questions and rehab questions   • Coronary Artery Disease   • Hyperlipidemia   • Hypertension     History of Present Illness    I am pleased to see Mr. Carpenter in my office today as a follow-up.     As you know, patient is 54 years old white gentleman whose past medical history is significant for hypertension, hyperlipidemia, CAD, CABG, who came today for follow-up.     In 2018, patient was referred to me for symptom of angina pectoris.  Patient underwent stress test which was abnormal.  Patient underwent cardiac catheterization which showed three-vessel coronary artery disease with significant left main stenosis.  Patient underwent CABG x4.    Since the previous visit, patient came today for unscheduled visit.  He had few questions.  Patient was reporting snapping sound in his left knee and also pain.  This is noticed during cardiac rehab.  Patient denies any chest pain or tightness or heaviness.  No orthopnea or PND.  Patient was worried that medications are causing knee pain.  Patient denies any orthopnea, PND, syncope or presyncope.  No leg edema noted.    At this stage, overall patient is doing well.  His knee pain is probably due to arthritis.  Patient is advised to discuss with PCP.  His blood pressure is slightly elevated.  I advised him to discontinue Plavix and start aspirin 81 mg daily.  Blood pressure monitoring is recommended.      Past Medical History:   Diagnosis Date   • Acute kidney injury (CMS/HCC) 10/03/2018    Consulted by Dr. Nunez   • Aortic regurgitation 2016   • Aortic stenosis 10/02/2018    L Main Noted on Cardiac Cath   • Family history of heart disease     Enlarged Hearts & Valve Replacements   • History of  chest x-ray 10/5/18-[facility]    Stable but Small L Apical Pneumothorax   • History of echocardiogram 03/23/16-[facility]    Mild Concentrive L Ventricular Hypertrophy; Moderate Aortic Reg; Mild Mitral Reg   • HLD (hyperlipidemia)    • Hx of chest x-ray 10/3/18-[facility]    Small L Apical Pneumothorax    • Hypertension    • Hypogonadism in male    • Hypokalemia    • Mild concentric left ventricular hypertrophy 03/23/2016    Noted on Echo   • Pneumothorax 10/05/2018    Small Noted on Chest XR   • SVT (supraventricular tachycardia) (CMS/HCC) Hx    S/p Ablation in 2006         Past Surgical History:   Procedure Laterality Date   • APPENDECTOMY  1990   • CARDIAC ABLATION  2006   • CARDIAC CATHETERIZATION Left 10/2/18-[facility]    w/Arteriography/Angiography-Dr. Hamlin--100% Occulsion of RCA; Significant L Main Stenosis   • CHOLECYSTECTOMY  2004   • CORONARY ARTERY BYPASS GRAFT  10/03/2018    urgent X4   Dr Farris           Current Outpatient Medications:   •  carvedilol (COREG) 25 MG tablet, Take 1 tablet by mouth 2 (Two) Times a Day., Disp: 60 tablet, Rfl: 5  •  Chorionic Gonadotropin (HCG IJ), Inject 280 mL as directed Every Other Day., Disp: , Rfl:   •  clopidogrel (PLAVIX) 75 MG tablet, Take 1 tablet by mouth daily, Disp: 90 tablet, Rfl: 3  •  coenzyme Q10 100 MG capsule, Take 100 mg by mouth Daily., Disp: , Rfl:   •  Copper Gluconate (COPPER CAPS) 2 MG capsule, Take  by mouth., Disp: , Rfl:   •  pravastatin (PRAVACHOL) 80 MG tablet, Take 80 mg by mouth every night at bedtime., Disp: , Rfl: 3  •  raNITIdine (ZANTAC) 150 MG tablet, Every Night., Disp: , Rfl: 0  •  Testosterone Cypionate 50 MG/ML solution, EVERY OTHER DAY, Disp: , Rfl:       Social History     Socioeconomic History   • Marital status:      Spouse name: Fely   • Number of children: Not on file   • Years of education: Not on file   • Highest education level: Not on file   Tobacco Use   • Smoking status: Never Smoker   • Smokeless tobacco: Never Used   Substance and Sexual  "Activity   • Alcohol use: No   • Drug use: No   • Sexual activity: Defer         Review of Systems   Constitution: Negative for chills and fever.   HENT: Negative for ear discharge and nosebleeds.    Eyes: Negative for discharge and redness.   Cardiovascular: Negative for chest pain, orthopnea, palpitations, paroxysmal nocturnal dyspnea and syncope.   Respiratory: Negative for cough, shortness of breath and wheezing.    Endocrine: Negative for heat intolerance.   Skin: Negative for rash.   Musculoskeletal: Positive for arthritis and joint pain. Negative for myalgias.   Gastrointestinal: Negative for abdominal pain, melena, nausea and vomiting.   Genitourinary: Negative for dysuria and hematuria.   Neurological: Negative for dizziness, light-headedness, numbness and tremors.   Psychiatric/Behavioral: Negative for depression. The patient is not nervous/anxious.        Procedures    Procedures    No orders to display           Objective:    /86   Pulse 57   Ht 182.9 cm (72\")   Wt 80.3 kg (177 lb)   BMI 24.01 kg/m²         Physical Exam   Constitutional: He is oriented to person, place, and time. He appears well-developed and well-nourished.   HENT:   Head: Normocephalic and atraumatic.   Eyes: No scleral icterus.   Neck: No thyromegaly present.   Cardiovascular: Normal rate, regular rhythm and normal heart sounds. Exam reveals no gallop and no friction rub.   No murmur heard.  Pulmonary/Chest: Effort normal and breath sounds normal. No respiratory distress. He has no wheezes. He has no rales.   Abdominal: There is no tenderness.   Musculoskeletal: He exhibits no edema.   Lymphadenopathy:     He has no cervical adenopathy.   Neurological: He is alert and oriented to person, place, and time.   Skin: No rash noted. No erythema.   Psychiatric: He has a normal mood and affect.           Assessment:       Diagnosis Plan   1. Coronary artery disease involving native coronary artery of native heart without angina " pectoris     2. Paroxysmal atrial fibrillation (CMS/HCC)     3. Other hyperlipidemia     4. Varicose veins of bilateral lower extremities with other complications     5. Essential hypertension     6. Shortness of breath              Plan:       At this stage, I would recommend that patient should proceed with current therapy.  Patient is advised to discuss with PCP for possible x-rays of the knee.  Patient is maintaining sinus rhythm.  I will see the patient in 6 months

## 2019-11-20 ENCOUNTER — TELEPHONE (OUTPATIENT)
Dept: CARDIOLOGY | Facility: CLINIC | Age: 54
End: 2019-11-20

## 2019-11-20 NOTE — TELEPHONE ENCOUNTER
Patient called and stated on Monday 11/18 after rehab he was waiting to have his heart monitor removed and during this time he passed out. He was taken to the ER and was released but the ER physician wanted the patient to let Dr. Hamlin know that this had happened.

## 2019-11-20 NOTE — TELEPHONE ENCOUNTER
Spoke with patient and he stated he hasn't had anymore episodes, but he stated he call us if did. I let him know that we would call him with the results of his heart monitor as soon we got them back.

## 2019-12-10 ENCOUNTER — OFFICE VISIT (OUTPATIENT)
Dept: CARDIOLOGY | Facility: CLINIC | Age: 54
End: 2019-12-10

## 2019-12-10 VITALS
SYSTOLIC BLOOD PRESSURE: 130 MMHG | HEIGHT: 72 IN | WEIGHT: 182 LBS | BODY MASS INDEX: 24.65 KG/M2 | HEART RATE: 67 BPM | DIASTOLIC BLOOD PRESSURE: 82 MMHG

## 2019-12-10 DIAGNOSIS — R55 SYNCOPE AND COLLAPSE: Primary | ICD-10-CM

## 2019-12-10 DIAGNOSIS — I25.10 CORONARY ARTERY DISEASE INVOLVING NATIVE CORONARY ARTERY OF NATIVE HEART WITHOUT ANGINA PECTORIS: ICD-10-CM

## 2019-12-10 DIAGNOSIS — I10 ESSENTIAL HYPERTENSION: ICD-10-CM

## 2019-12-10 DIAGNOSIS — I48.0 PAROXYSMAL ATRIAL FIBRILLATION (HCC): ICD-10-CM

## 2019-12-10 DIAGNOSIS — Z95.1 S/P CABG (CORONARY ARTERY BYPASS GRAFT): ICD-10-CM

## 2019-12-10 PROCEDURE — 99214 OFFICE O/P EST MOD 30 MIN: CPT | Performed by: NURSE PRACTITIONER

## 2019-12-10 PROCEDURE — 93000 ELECTROCARDIOGRAM COMPLETE: CPT | Performed by: NURSE PRACTITIONER

## 2019-12-10 RX ORDER — PREDNISONE 20 MG/1
TABLET ORAL
Refills: 0 | COMMUNITY
Start: 2019-11-22 | End: 2019-12-10

## 2019-12-10 RX ORDER — ASPIRIN 81 MG/1
81 TABLET, CHEWABLE ORAL DAILY
COMMUNITY
End: 2021-03-17

## 2019-12-10 RX ORDER — DOXYCYCLINE HYCLATE 100 MG/1
100 CAPSULE ORAL 2 TIMES DAILY
Refills: 0 | COMMUNITY
Start: 2019-11-15 | End: 2019-12-10

## 2019-12-10 RX ORDER — PREDNISOLONE SODIUM PHOSPHATE 30 MG/1
TABLET, ORALLY DISINTEGRATING ORAL
Refills: 0 | COMMUNITY
Start: 2019-11-29 | End: 2019-12-10

## 2019-12-10 RX ORDER — ALBUTEROL SULFATE 90 UG/1
AEROSOL, METERED RESPIRATORY (INHALATION)
Refills: 0 | COMMUNITY
Start: 2019-11-20 | End: 2019-12-10

## 2019-12-13 PROBLEM — R55 SYNCOPE AND COLLAPSE: Status: ACTIVE | Noted: 2019-12-13

## 2019-12-13 PROBLEM — Z95.1 S/P CABG (CORONARY ARTERY BYPASS GRAFT): Status: ACTIVE | Noted: 2019-12-13

## 2020-02-12 ENCOUNTER — OFFICE VISIT (OUTPATIENT)
Dept: FAMILY MEDICINE CLINIC | Facility: CLINIC | Age: 55
End: 2020-02-12

## 2020-02-12 VITALS
OXYGEN SATURATION: 98 % | BODY MASS INDEX: 24.38 KG/M2 | DIASTOLIC BLOOD PRESSURE: 82 MMHG | SYSTOLIC BLOOD PRESSURE: 140 MMHG | HEART RATE: 66 BPM | WEIGHT: 180 LBS | RESPIRATION RATE: 20 BRPM | TEMPERATURE: 98.3 F | HEIGHT: 72 IN

## 2020-02-12 DIAGNOSIS — I48.0 PAROXYSMAL ATRIAL FIBRILLATION (HCC): ICD-10-CM

## 2020-02-12 DIAGNOSIS — K21.9 GASTROESOPHAGEAL REFLUX DISEASE, ESOPHAGITIS PRESENCE NOT SPECIFIED: ICD-10-CM

## 2020-02-12 DIAGNOSIS — I25.10 CORONARY ARTERY DISEASE INVOLVING NATIVE CORONARY ARTERY OF NATIVE HEART WITHOUT ANGINA PECTORIS: ICD-10-CM

## 2020-02-12 DIAGNOSIS — I10 ESSENTIAL HYPERTENSION: ICD-10-CM

## 2020-02-12 DIAGNOSIS — Z95.1 S/P CABG (CORONARY ARTERY BYPASS GRAFT): ICD-10-CM

## 2020-02-12 DIAGNOSIS — E29.1 PRIMARY MALE HYPOGONADISM: Primary | ICD-10-CM

## 2020-02-12 PROCEDURE — 99203 OFFICE O/P NEW LOW 30 MIN: CPT | Performed by: FAMILY MEDICINE

## 2020-02-12 RX ORDER — FAMOTIDINE 40 MG/1
40 TABLET, FILM COATED ORAL 2 TIMES DAILY
Qty: 180 TABLET | Refills: 3 | Status: SHIPPED | OUTPATIENT
Start: 2020-02-12 | End: 2020-09-30 | Stop reason: SDUPTHER

## 2020-02-12 RX ORDER — TESTOSTERONE CYPIONATE 200 MG/ML
INJECTION, SOLUTION INTRAMUSCULAR
COMMUNITY
End: 2022-04-23

## 2020-03-12 ENCOUNTER — TELEPHONE (OUTPATIENT)
Dept: CARDIOLOGY | Facility: CLINIC | Age: 55
End: 2020-03-12

## 2020-03-12 NOTE — TELEPHONE ENCOUNTER
Who would you recommend for a vascular doctor   He wants his legs and veins checked out for blockage

## 2020-03-16 ENCOUNTER — TRANSCRIBE ORDERS (OUTPATIENT)
Dept: CARDIOLOGY | Facility: CLINIC | Age: 55
End: 2020-03-16

## 2020-03-16 DIAGNOSIS — I83.893 VARICOSE VEINS OF BILATERAL LOWER EXTREMITIES WITH OTHER COMPLICATIONS: Primary | ICD-10-CM

## 2020-03-16 NOTE — TELEPHONE ENCOUNTER
Spoke with patient 4/16/2020 at 9am patient to arrive at 8:30. Let the patient know of the appointment.

## 2020-04-22 ENCOUNTER — OFFICE VISIT (OUTPATIENT)
Dept: CARDIOLOGY | Facility: CLINIC | Age: 55
End: 2020-04-22

## 2020-04-22 VITALS
SYSTOLIC BLOOD PRESSURE: 132 MMHG | BODY MASS INDEX: 24.65 KG/M2 | DIASTOLIC BLOOD PRESSURE: 76 MMHG | WEIGHT: 182 LBS | HEART RATE: 66 BPM | HEIGHT: 72 IN

## 2020-04-22 DIAGNOSIS — R55 SYNCOPE AND COLLAPSE: ICD-10-CM

## 2020-04-22 DIAGNOSIS — I48.0 PAROXYSMAL ATRIAL FIBRILLATION (HCC): ICD-10-CM

## 2020-04-22 DIAGNOSIS — E78.2 MIXED HYPERLIPIDEMIA: ICD-10-CM

## 2020-04-22 DIAGNOSIS — I25.10 CORONARY ARTERY DISEASE INVOLVING NATIVE CORONARY ARTERY OF NATIVE HEART WITHOUT ANGINA PECTORIS: Primary | ICD-10-CM

## 2020-04-22 DIAGNOSIS — I10 ESSENTIAL HYPERTENSION: ICD-10-CM

## 2020-04-22 PROCEDURE — 99214 OFFICE O/P EST MOD 30 MIN: CPT | Performed by: INTERNAL MEDICINE

## 2020-04-22 PROCEDURE — 93000 ELECTROCARDIOGRAM COMPLETE: CPT | Performed by: INTERNAL MEDICINE

## 2020-04-22 RX ORDER — CLOPIDOGREL BISULFATE 75 MG/1
1 TABLET ORAL DAILY
COMMUNITY
Start: 2020-04-20 | End: 2020-05-26 | Stop reason: SDUPTHER

## 2020-04-22 NOTE — PROGRESS NOTES
Date of Office Visit: 2020  Encounter Provider: Dr. Timo Hamlin  Place of Service: [facility] CARDIOLOGY [place]  Patient Name: Emmanuel Carpenter  :1965  Yue Adamson MD    Chief Complaint   Patient presents with   • Atrial Fibrillation     4 month follow up   • Coronary Artery Disease   • Hypertension   • Hyperlipidemia     History of Present Illness:    I am pleased to see Mr. Carpenter in my office today as a follow-up.     As you know, patient is 55 years old white gentleman whose past medical history is significant for hypertension, hyperlipidemia, CAD, CABG, who came today for follow-up.     In 2018, patient was referred to me for symptom of angina pectoris.  Patient underwent stress test which was abnormal.  Patient underwent cardiac catheterization which showed three-vessel coronary artery disease with significant left main stenosis.  Patient underwent CABG x4.    Since the previous visit, patient is doing fairly well from cardiovascular standpoint.  Patient denies any symptom of chest pain or tightness or heaviness.  No orthopnea, PND, syncope or presyncope.  No leg edema noted.  No palpitation or recurrence of atrial fibrillation.    EKG showed normal sinus rhythm.  No ST changes.    At this stage, I am very pleased with the patient progress.  His blood pressure is very well controlled.  His symptoms are limited.  He is in a stable condition.  I will continue current therapy.    Past Medical History:   Diagnosis Date   • Actinic keratosis    • Acute kidney injury (CMS/HCC) 10/03/2018    Consulted by Dr. Nunez   • Aortic regurgitation    • Aortic stenosis 10/02/2018    L Main Noted on Cardiac Cath   • Atrial fibrillation (CMS/HCC)    • Bronchitis    • Detached retina    • Family history of heart disease     Enlarged Hearts & Valve Replacements   • GERD (gastroesophageal reflux disease)    • History of chest x-ray 10/5/18-F    Stable but Small L Apical Pneumothorax    • History of echocardiogram 03/23/16-[facility]    Mild Concentrive L Ventricular Hypertrophy; Moderate Aortic Reg; Mild Mitral Reg   • HLD (hyperlipidemia)    • Hx of chest x-ray 10/3/18-F    Small L Apical Pneumothorax    • Hypertension    • Hypogonadism in male    • Hypokalemia    • Left ventricular hypertrophy by electrocardiogram     in the past by echo- mild.    • Mild concentric left ventricular hypertrophy 03/23/2016    Noted on Echo   • Pelvis tilted    • Pneumothorax 10/05/2018    Small Noted on Chest XR   • Polycythemia    • SVT (supraventricular tachycardia) (CMS/HCC) Hx    S/p Ablation in 2006         Past Surgical History:   Procedure Laterality Date   • APPENDECTOMY  1990   • CARDIAC ABLATION  2006   • CARDIAC CATHETERIZATION Left 10/2/18-F    w/Arteriography/Angiography-Dr. Hamlin--100% Occulsion of RCA; Significant L Main Stenosis   • CHOLECYSTECTOMY  2004   • CORONARY ARTERY BYPASS GRAFT  10/03/2018    urgent X4   Dr Farris           Current Outpatient Medications:   •  aspirin 81 MG chewable tablet, Chew 81 mg Daily., Disp: , Rfl:   •  carvedilol (COREG) 25 MG tablet, Take 1 tablet by mouth 2 (Two) Times a Day., Disp: 60 tablet, Rfl: 5  •  clopidogrel (PLAVIX) 75 MG tablet, 1 tablet Daily., Disp: , Rfl:   •  Copper Gluconate (COPPER CAPS) 2 MG capsule, Take  by mouth., Disp: , Rfl:   •  famotidine (PEPCID) 40 MG tablet, Take 1 tablet by mouth 2 (Two) Times a Day., Disp: 180 tablet, Rfl: 3  •  pravastatin (PRAVACHOL) 80 MG tablet, Take 80 mg by mouth every night at bedtime., Disp: , Rfl: 3  •  Testosterone Cypionate (DEPO-TESTOSTERONE) 200 MG/ML injection, Inject  into the appropriate muscle as directed by prescriber Every 14 (Fourteen) Days. 0.2 mL IM or SQ every other day., Disp: , Rfl:       Social History     Socioeconomic History   • Marital status:      Spouse name: Fely   • Number of children: Not on file   • Years of education: Not on file   • Highest education level: Not on file  "  Tobacco Use   • Smoking status: Never Smoker   • Smokeless tobacco: Never Used   Substance and Sexual Activity   • Alcohol use: No   • Drug use: No   • Sexual activity: Defer         Review of Systems   Constitution: Negative for chills and fever.   HENT: Negative for ear discharge and nosebleeds.    Eyes: Negative for discharge and redness.   Cardiovascular: Negative for chest pain, orthopnea, palpitations, paroxysmal nocturnal dyspnea and syncope.   Respiratory: Negative for cough, shortness of breath and wheezing.    Endocrine: Negative for heat intolerance.   Skin: Negative for rash.   Musculoskeletal: Negative for arthritis and myalgias.   Gastrointestinal: Negative for abdominal pain, melena, nausea and vomiting.   Genitourinary: Negative for dysuria and hematuria.   Neurological: Negative for dizziness, light-headedness, numbness and tremors.   Psychiatric/Behavioral: Negative for depression. The patient is nervous/anxious.        Procedures      ECG 12 Lead  Date/Time: 4/22/2020 2:06 PM  Performed by: Timo Hamlin MD  Authorized by: Timo Hamlin MD   Comparison: compared with previous ECG   Similar to previous ECG  Rhythm: sinus rhythm    Clinical impression: normal ECG            ECG 12 Lead    (Results Pending)           Objective:    /76   Pulse 66   Ht 182.9 cm (72.01\")   Wt 82.6 kg (182 lb)   BMI 24.68 kg/m²         Physical Exam   Constitutional: He is oriented to person, place, and time. He appears well-developed and well-nourished.   HENT:   Head: Normocephalic and atraumatic.   Eyes: Right eye exhibits no discharge. No scleral icterus.   Neck: No thyromegaly present.   Cardiovascular: Normal rate, regular rhythm and normal heart sounds. Exam reveals no gallop and no friction rub.   No murmur heard.  Pulmonary/Chest: Effort normal and breath sounds normal. No respiratory distress. He has no wheezes. He has no rales.   Abdominal: There is no tenderness.   Musculoskeletal: He exhibits no " edema.   Lymphadenopathy:     He has no cervical adenopathy.   Neurological: He is alert and oriented to person, place, and time.   Skin: No rash noted. No erythema.   Psychiatric: He has a normal mood and affect.           Assessment:       Diagnosis Plan   1. Coronary artery disease involving native coronary artery of native heart without angina pectoris  ECG 12 Lead   2. Paroxysmal atrial fibrillation (CMS/HCC)  ECG 12 Lead   3. Essential hypertension  ECG 12 Lead   4. Syncope and collapse  ECG 12 Lead   5. Mixed hyperlipidemia  ECG 12 Lead            Plan:       At this stage, I will continue current treatment.  Blood pressure monitoring is recommended.  Increase aerobic activity recommended.  Diet modification discussed

## 2020-05-08 RX ORDER — PRAVASTATIN SODIUM 80 MG/1
80 TABLET ORAL
Qty: 30 TABLET | Refills: 5 | Status: SHIPPED | OUTPATIENT
Start: 2020-05-08 | End: 2020-09-30 | Stop reason: SDUPTHER

## 2020-05-12 ENCOUNTER — APPOINTMENT (OUTPATIENT)
Dept: VASCULAR SURGERY | Facility: HOSPITAL | Age: 55
End: 2020-05-12

## 2020-05-13 ENCOUNTER — OFFICE VISIT (OUTPATIENT)
Dept: FAMILY MEDICINE CLINIC | Facility: CLINIC | Age: 55
End: 2020-05-13

## 2020-05-13 VITALS
WEIGHT: 178 LBS | DIASTOLIC BLOOD PRESSURE: 79 MMHG | BODY MASS INDEX: 24.11 KG/M2 | SYSTOLIC BLOOD PRESSURE: 126 MMHG | HEIGHT: 72 IN | OXYGEN SATURATION: 99 % | TEMPERATURE: 97.1 F | HEART RATE: 62 BPM

## 2020-05-13 DIAGNOSIS — H92.03 ACUTE EAR PAIN, BILATERAL: Primary | ICD-10-CM

## 2020-05-13 PROBLEM — H66.004 RECURRENT ACUTE SUPPURATIVE OTITIS MEDIA OF RIGHT EAR WITHOUT SPONTANEOUS RUPTURE OF TYMPANIC MEMBRANE: Status: ACTIVE | Noted: 2020-05-13

## 2020-05-13 PROCEDURE — 99213 OFFICE O/P EST LOW 20 MIN: CPT | Performed by: FAMILY MEDICINE

## 2020-05-13 RX ORDER — FLUTICASONE PROPIONATE 50 MCG
2 SPRAY, SUSPENSION (ML) NASAL DAILY
Qty: 1 BOTTLE | Refills: 0 | Status: SHIPPED | OUTPATIENT
Start: 2020-05-13 | End: 2020-06-29

## 2020-05-13 RX ORDER — GUAIFENESIN 600 MG/1
600 TABLET, EXTENDED RELEASE ORAL 2 TIMES DAILY
Qty: 28 TABLET | Refills: 0 | Status: SHIPPED | OUTPATIENT
Start: 2020-05-13 | End: 2020-05-27

## 2020-05-13 RX ORDER — AMOXICILLIN AND CLAVULANATE POTASSIUM 875; 125 MG/1; MG/1
1 TABLET, FILM COATED ORAL 2 TIMES DAILY
Qty: 28 TABLET | Refills: 0 | Status: SHIPPED | OUTPATIENT
Start: 2020-05-13 | End: 2020-05-18

## 2020-05-13 NOTE — PROGRESS NOTES
"Subjective   Emmanuel Carpenter is a 55 y.o. male.   Chief Complaint   Patient presents with   • Earache       History of Present Illness     Presents to the office today with a complaint of ear fullness for several weeks.  It feels like there is fluid in the right ear.  He denies any fevers or chills.  Hearing acuity is slightly decreased.  More so in the right ear than the left ear.  He denies actual pain but the right ear is uncomfortable when he lays on it at night to sleep.  He has had associated nasal congestion.    He has copies of lab work done at [facility] dated April 20 ordered by Clarissa Chadwick.  He wanted to go over this with me today.  He specifically had questions about glucose.  His A1c was 5.7%.  His blood sugar was 100.  Total cholesterol was 114, HDL was 45, LDL was 57.    He has not been able to see Dr. Rocha yet.  He asked once again if I would be interested in taking over management of his testosterone.  He indicated that the \"specialist\" in [place] was more than happy to work with a local doctor and basically tell them what labs needed to be ordered and what needed to be done.    Patient Active Problem List    Diagnosis Date Noted   • Recurrent acute suppurative otitis media of right ear without spontaneous rupture of tympanic membrane 05/13/2020   • Mixed hyperlipidemia 04/22/2020   • Syncope and collapse 12/13/2019   • S/P CABG (coronary artery bypass graft) 12/13/2019   • Essential hypertension 10/22/2019     Note Last Updated: 10/22/2019     Patient's log blood pressures been reviewed has periods of systolic blood pressures in the 140s and diastolics greater than 80s     • Gastroesophageal reflux disease 11/13/2018   • Coronary artery disease involving native coronary artery of native heart without angina pectoris 11/13/2018     Note Last Updated: 9/26/2019     S/p CABG     • Paroxysmal atrial fibrillation (CMS/[facility]) 11/13/2018   • Shortness of breath 08/29/2018   • Varicose " veins of bilateral lower extremities with other complications 06/03/2016   • Anxiety 01/12/2015   • Erythrocytosis 01/12/2015   • Primary male hypogonadism 01/12/2015   • Hypokalemia 08/29/2013   • Other specified disorders of adrenal gland (CMS/HCC) 08/01/2013   • Hypogonadism 07/19/2013           Past Surgical History:   Procedure Laterality Date   • APPENDECTOMY  1990   • CARDIAC ABLATION  2006   • CARDIAC CATHETERIZATION Left 10/2/18-[facility]    w/Arteriography/Angiography-Dr. Hamlin--100% Occulsion of RCA; Significant L Main Stenosis   • CHOLECYSTECTOMY  2004   • CORONARY ARTERY BYPASS GRAFT  10/03/2018    urgent X4   Dr Farris     Current Outpatient Medications on File Prior to Visit   Medication Sig   • aspirin 81 MG chewable tablet Chew 81 mg Daily.   • carvedilol (COREG) 25 MG tablet Take 1 tablet by mouth 2 (Two) Times a Day.   • clopidogrel (PLAVIX) 75 MG tablet 1 tablet Daily.   • Copper Gluconate (COPPER CAPS) 2 MG capsule Take  by mouth.   • famotidine (PEPCID) 40 MG tablet Take 1 tablet by mouth 2 (Two) Times a Day.   • pravastatin (PRAVACHOL) 80 MG tablet Take 1 tablet by mouth every night at bedtime.   • Testosterone Cypionate (DEPO-TESTOSTERONE) 200 MG/ML injection Inject  into the appropriate muscle as directed by prescriber Every 14 (Fourteen) Days. 0.2 mL IM or SQ every other day.     No current facility-administered medications on file prior to visit.      No Known Allergies  Social History     Socioeconomic History   • Marital status:      Spouse name: Fely   • Number of children: Not on file   • Years of education: Not on file   • Highest education level: Not on file   Tobacco Use   • Smoking status: Never Smoker   • Smokeless tobacco: Never Used   Substance and Sexual Activity   • Alcohol use: No   • Drug use: No   • Sexual activity: Defer     Family History   Problem Relation Age of Onset   • Heart defect Maternal Grandmother         Enlarged Heart   • Atrial fibrillation Maternal  "Grandmother    • Diabetes Maternal Grandmother    • Hypertension Mother    • Melanoma Father    • Heart disease Maternal Uncle    • Diabetes Paternal Grandmother      The following portions of the patient's history were reviewed and updated as appropriate: allergies, current medications, past family history, past medical history, past social history, past surgical history and problem list.    Review of Systems   Constitutional: Negative for chills and fever.   HENT: Positive for congestion.    Respiratory: Negative for cough and shortness of breath.        Objective   /79 (BP Location: Right arm, Patient Position: Sitting, Cuff Size: Adult)   Pulse 62   Temp 97.1 °F (36.2 °C) (Oral)   Ht 182.9 cm (72.01\")   Wt 80.7 kg (178 lb)   SpO2 99%   BMI 24.14 kg/m²   Physical Exam   Constitutional: He is oriented to person, place, and time. He appears well-developed and well-nourished.   HENT:   Head: Normocephalic and atraumatic.   Right Ear: Tympanic membrane is injected, erythematous and bulging. Tympanic membrane is not perforated. A middle ear effusion (dark red/pink fluid) is present. Decreased hearing is noted. cerumen impaction is not present.  Left Ear: Hearing normal. Tympanic membrane is not injected, not scarred, not perforated and not erythematous. A middle ear effusion (very small) is present. An impacted cerumen is not present.  Eyes: Conjunctivae and EOM are normal.   Neck: Normal range of motion.   Cardiovascular: Normal rate.   Pulmonary/Chest: Effort normal.   Musculoskeletal: Normal range of motion.   Neurological: He is alert and oriented to person, place, and time.   Skin: Skin is warm and dry. No rash noted.   Psychiatric: He has a normal mood and affect. His behavior is normal.       Assessment/Plan   Diagnoses and all orders for this visit:    1. Acute ear pain, bilateral (Primary)  -     amoxicillin-clavulanate (Augmentin) 875-125 MG per tablet; Take 1 tablet by mouth 2 (Two) Times a Day " for 14 days.  Dispense: 28 tablet; Refill: 0  -     fluticasone (Flonase) 50 MCG/ACT nasal spray; 2 sprays into the nostril(s) as directed by provider Daily.  Dispense: 1 bottle; Refill: 0  -     guaiFENesin (Mucinex) 600 MG 12 hr tablet; Take 1 tablet by mouth 2 (Two) Times a Day for 14 days.  Dispense: 28 tablet; Refill: 0    In addition to minimal fluid in the left ear, he has a right otitis media.  Will treat with Augmentin twice daily for 14 days, Mucinex twice daily for 14 days with lots of water and Flonase 2 sprays in each nostril once daily until the bottle is empty.  If he continues to have symptoms in 2 weeks, let me know and I will need to look in his ear again.  Keep follow-up with his cardiologist per his instructions.    We talked briefly about his blood work.  It certainly seems Dr. Chadwick is still managing a lot of his problems.  I explained that the labs were not consistent with diabetes and I recommended annual bradycardia assessment as well as age-appropriate preventive care according to USPSTF guidelines.  Once again, I declined to take over management of his hypogonadism.    Follow-up here as needed.         Return if symptoms worsen or fail to improve.    Call with any problems or concerns before next visit

## 2020-05-18 ENCOUNTER — TELEPHONE (OUTPATIENT)
Dept: FAMILY MEDICINE CLINIC | Facility: CLINIC | Age: 55
End: 2020-05-18

## 2020-05-18 RX ORDER — SULFAMETHOXAZOLE AND TRIMETHOPRIM 800; 160 MG/1; MG/1
1 TABLET ORAL 2 TIMES DAILY
Qty: 14 TABLET | Refills: 0 | Status: SHIPPED | OUTPATIENT
Start: 2020-05-18 | End: 2020-05-25

## 2020-05-18 NOTE — TELEPHONE ENCOUNTER
Pt called and states that he is on Augmentin amoxicillin 875 bid. It is giving him diarrhea, he says in the past when he has taken it, it does that to him and wants to know if he can be changed to another medication. Please advise. Thanks

## 2020-05-18 NOTE — TELEPHONE ENCOUNTER
Yes, absolutely.  When Augmentin does this, the right thing to do is stop it.  We will replace it with 7 days of Bactrim DS.  I have sent this prescription to [facility].  This is also 1 pill twice daily.  Thanks

## 2020-05-26 RX ORDER — CLOPIDOGREL BISULFATE 75 MG/1
75 TABLET ORAL DAILY
Qty: 90 TABLET | Refills: 1 | Status: SHIPPED | OUTPATIENT
Start: 2020-05-26 | End: 2020-09-30 | Stop reason: SDUPTHER

## 2020-06-29 ENCOUNTER — OFFICE VISIT (OUTPATIENT)
Dept: ENDOCRINOLOGY | Facility: CLINIC | Age: 55
End: 2020-06-29

## 2020-06-29 VITALS
TEMPERATURE: 97.9 F | HEART RATE: 76 BPM | WEIGHT: 180 LBS | DIASTOLIC BLOOD PRESSURE: 75 MMHG | OXYGEN SATURATION: 98 % | BODY MASS INDEX: 24.38 KG/M2 | SYSTOLIC BLOOD PRESSURE: 130 MMHG | HEIGHT: 72 IN

## 2020-06-29 DIAGNOSIS — E29.1 MALE HYPOGONADISM: Primary | ICD-10-CM

## 2020-06-29 PROCEDURE — 99203 OFFICE O/P NEW LOW 30 MIN: CPT | Performed by: INTERNAL MEDICINE

## 2020-06-29 NOTE — PROGRESS NOTES
Endocrine Consult Outpatient  Referred by Dr. Yue Adamson for low testosterone consultation  Patient Care Team:  Yue Adamson MD as PCP - General (Family Medicine)     Chief Complaint: Hypogonadism    HPI: 55-year-old male with history of hypertension, heart disease with a CABG done in 2018 has history of hypogonadism for last 8 years at least.  He is following with a physician in [place] and has been taking testosterone injections 20 mg every day for last 3 to 4 years.  He also was on hCG injections he took it for 3 to 4 years but he stopped about 6 months ago.  He is also on DHEA 50 mg p.o. daily.  He also took clomiphene at some point which did raise his testosterone but then he stopped and went on testosterone injections.  He has developed elevated hematocrit and has been getting phlebotomies every quarter in [place] as well.  He wanted to explore other options with regards to his testosterone injections here in the local area.    Past Medical History:   Diagnosis Date   • Actinic keratosis    • Acute kidney injury (CMS/HCC) 10/03/2018    Consulted by Dr. Nunez   • Aortic regurgitation 2016   • Aortic stenosis 10/02/2018    L Main Noted on Cardiac Cath   • Atrial fibrillation (CMS/HCC)    • Bronchitis    • Cataract    • Detached retina    • Family history of heart disease     Enlarged Hearts & Valve Replacements   • GERD (gastroesophageal reflux disease)    • History of chest x-ray 10/5/18-[facility]    Stable but Small L Apical Pneumothorax   • History of echocardiogram 03/23/16-[facility]    Mild Concentrive L Ventricular Hypertrophy; Moderate Aortic Reg; Mild Mitral Reg   • HLD (hyperlipidemia)    • Hx of chest x-ray 10/3/18-[facility]    Small L Apical Pneumothorax    • Hypertension    • Hypogonadism in male    • Hypokalemia    • Left ventricular hypertrophy by electrocardiogram     in the past by echo- mild.    • Mild concentric left ventricular hypertrophy 03/23/2016    Noted on Echo   • Pelvis tilted    •  Pneumothorax 10/05/2018    Small Noted on Chest XR   • Polycythemia    • SVT (supraventricular tachycardia) (CMS/HCC) Hx    S/p Ablation in 2006       Social History     Socioeconomic History   • Marital status:      Spouse name: Fely   • Number of children: Not on file   • Years of education: Not on file   • Highest education level: Not on file   Tobacco Use   • Smoking status: Never Smoker   • Smokeless tobacco: Never Used   Substance and Sexual Activity   • Alcohol use: No   • Drug use: No   • Sexual activity: Defer       Family History   Problem Relation Age of Onset   • Heart defect Maternal Grandmother         Enlarged Heart   • Atrial fibrillation Maternal Grandmother    • Diabetes Maternal Grandmother    • Hypertension Mother    • Melanoma Father    • Diabetes Father    • Heart disease Maternal Uncle    • Diabetes Paternal Grandmother        Allergies   Allergen Reactions   • Other Other (See Comments)     Perfumes        ROS:   Constitutional:  Denies fatigue, tiredness.    Eyes:  Denies change in visual acuity   HENT:  Denies nasal congestion or sore throat   Respiratory: denies cough, shortness of breath.   Cardiovascular:  denies chest pain, edema   GI:  Denies abdominal pain, nausea, vomiting.    :  Denies dysuria   Musculoskeletal:  Denies back pain or joint pain   Integument:  Denies dry skin, rash   Neurologic:  Denies headache, focal weakness or sensory changes   Endocrine:  Denies polyuria or polydipsia   Psychiatric:  Denies depression or anxiety      Vitals:    06/29/20 1500   BP: 130/75   Pulse: 76   Temp: 97.9 °F (36.6 °C)   SpO2: 98%        Physical Exam:  GEN: NAD, conversant  EYES: EOMI, PERRL, no conjunctival erythema  NECK: no thyromegaly, full ROM   CV: RRR, no murmurs/rubs/gallops, no peripheral edema  LUNG: CTAB, no wheezes/rales/ronchi  SKIN: no rashes, no acanthosis  MSK: no deformities, full ROM of all extremities  NEURO: no tremors, DTR normal  PSYCH: AOX3, appropriate  mood, affect normal      Results Review:     I reviewed the patient's new clinical results.    Lab Results   Component Value Date    GLUCOSE 118 (H) 10/09/2018    BUN 16 10/09/2018    CREATININE 0.9 10/09/2018    BCR 17.8 10/09/2018    K 3.8 10/09/2018    CO2 26 10/09/2018    CALCIUM 9.0 10/09/2018    ALBUMIN 4.1 10/09/2018    LABIL2 1.4 10/09/2018    AST 24 10/09/2018    ALT 27 10/09/2018    CHOL 145 10/03/2018    TRIG 45 10/03/2018    HDL 38 (L) 10/03/2018     (H) 10/03/2018     Lab Results   Component Value Date    HGBA1C 5.2 10/02/2018     Lab Results   Component Value Date    CREATININE 0.9 10/09/2018     Old records reviewed: Labs from April 17, 2020 showed a estradiol level of 81 with estrone of 84, total testosterone was 866, sex hormone level was low at 7, free testosterone was high at 31.8 cholesterol total was 113 with triglycerides 56, HDL was 45 and LDL 57  Hemoglobin was 15.1 with hematocrit 46.1 white count of four 5.3  Sodium 139, potassium 4.5, chloride 101, CO2 29 A1c was 5.7% BUN was 14, creatinine 0.95, calcium 9.5 TSH was 1.93, AST 31, ALT 26 DHEA-sulfate was high at 313.2.  PSA was 0.91.    Medication Review: Reviewed.       Current Outpatient Medications:   •  aspirin 81 MG chewable tablet, Chew 81 mg Daily., Disp: , Rfl:   •  carvedilol (COREG) 25 MG tablet, Take 1 tablet by mouth 2 (Two) Times a Day., Disp: 60 tablet, Rfl: 5  •  clopidogrel (PLAVIX) 75 MG tablet, Take 1 tablet by mouth Daily., Disp: 90 tablet, Rfl: 1  •  Copper Gluconate (COPPER CAPS) 2 MG capsule, Take  by mouth., Disp: , Rfl:   •  famotidine (PEPCID) 40 MG tablet, Take 1 tablet by mouth 2 (Two) Times a Day., Disp: 180 tablet, Rfl: 3  •  pravastatin (PRAVACHOL) 80 MG tablet, Take 1 tablet by mouth every night at bedtime., Disp: 30 tablet, Rfl: 5  •  Testosterone Cypionate (DEPO-TESTOSTERONE) 200 MG/ML injection, Inject  into the appropriate muscle as directed by prescriber Every 14 (Fourteen) Days. 0.2 mL IM or SQ  every other day., Disp: , Rfl:     Assessment/Plan   Male Hypogonadism: At this time he is taking testosterone 20 milligrams subcu every day and he is following a regimen from a physician in [place].  He was on hCG which she has a stopped.  We talked about in detail that we can do testosterone injection 200 mg IM every 14 days or 100 mg IM every 7 days and injections will have to be done in the office.  He is now to think about it and will let us know if he wants to proceed with that.  The meantime he will keep doing what he is doing and will follow up with his physician in [place].  He does have elevated hematocrit which he is following with physician [place] with phlebotomy on regular basis.  He understand that testosterone replacement therapy is associated increased risk for CAD, CVA, BP BPH, DVT and PE.                    Papo Rocha MD FACE.

## 2020-07-10 ENCOUNTER — TELEPHONE (OUTPATIENT)
Dept: FAMILY MEDICINE CLINIC | Facility: CLINIC | Age: 55
End: 2020-07-10

## 2020-07-10 DIAGNOSIS — M54.50 LOW BACK PAIN, UNSPECIFIED BACK PAIN LATERALITY, UNSPECIFIED CHRONICITY, UNSPECIFIED WHETHER SCIATICA PRESENT: Primary | ICD-10-CM

## 2020-07-10 NOTE — TELEPHONE ENCOUNTER
Please place an order for a referral to [facility] for a diagnosis of mechanical low back pain.  Please schedule him here in a week or 2 with me so I can evaluate him. Thanks

## 2020-07-10 NOTE — TELEPHONE ENCOUNTER
PATIENT HAS BEEN HAVING BACK PAIN FOR A COUPLE OF WEEKS AND HAS GONE TO THE CHIROPRACTOR FOR THIS, BUT HAS NOT SEEN ANY IMPROVEMENT.  PATIENT IS REQUESTING ORDERS FOR PT

## 2020-08-03 ENCOUNTER — OFFICE VISIT (OUTPATIENT)
Dept: CARDIOLOGY | Facility: CLINIC | Age: 55
End: 2020-08-03

## 2020-08-03 VITALS
OXYGEN SATURATION: 99 % | SYSTOLIC BLOOD PRESSURE: 138 MMHG | DIASTOLIC BLOOD PRESSURE: 86 MMHG | HEART RATE: 58 BPM | BODY MASS INDEX: 23.57 KG/M2 | HEIGHT: 72 IN | WEIGHT: 174 LBS

## 2020-08-03 DIAGNOSIS — I10 ESSENTIAL HYPERTENSION: ICD-10-CM

## 2020-08-03 DIAGNOSIS — I25.10 CORONARY ARTERY DISEASE INVOLVING NATIVE CORONARY ARTERY OF NATIVE HEART WITHOUT ANGINA PECTORIS: Primary | ICD-10-CM

## 2020-08-03 DIAGNOSIS — I48.0 PAROXYSMAL ATRIAL FIBRILLATION (HCC): ICD-10-CM

## 2020-08-03 PROCEDURE — 99214 OFFICE O/P EST MOD 30 MIN: CPT | Performed by: INTERNAL MEDICINE

## 2020-08-03 NOTE — PROGRESS NOTES
Date of Office Visit: 2020  Encounter Provider: Dr. Timo Hamlin    Place of Service: [facility] [place]  Patient Name: Emmanuel Carpenter  :1965  Yue Adamson MD    Chief Complaint   Patient presents with   • Coronary Artery Disease     follow up   • Atrial Fibrillation   • Hypertension     History of Present Illness    I am pleased to see Mr. Carpenter in my office today as a follow-up.     As you know, patient is 55 years old white gentleman whose past medical history is significant for hypertension, hyperlipidemia, CAD, CABG, who came today for follow-up.     In 2018, patient was referred to me for symptom of angina pectoris.  Patient underwent stress test which was abnormal.  Patient underwent cardiac catheterization which showed three-vessel coronary artery disease with significant left main stenosis.  Patient underwent CABG x4.    On previous visit, patient was complaining of claudication in lower extremities and pain.  Patient underwent VANDANA and it was unremarkable.  Carotid Dopplers were also normal.    Since the previous visit patient continues to do well from cardiac standpoint.  He denies any chest pain or tightness or heaviness.  No orthopnea, PND, syncope or presyncope.  No shortness of breath.  Patient is doing aerobic exercises and does not reproduce any symptom.    At this stage, I am very pleased with the patient progress.  I will continue current treatment.  His anxiety is still present.  Patient is advised to do meditation and treatment for anxiety he is not inclined towards that..    At this stage I will recommend to discontinue Plavix and continue aspirin      Past Medical History:   Diagnosis Date   • Actinic keratosis    • Acute kidney injury (CMS/HCC) 10/03/2018    Consulted by Dr. Nunez   • Aortic regurgitation    • Aortic stenosis 10/02/2018    L Main Noted on Cardiac Cath   • Atrial fibrillation (CMS/HCC)    • Bronchitis    • Cataract    •  Detached retina    • Family history of heart disease     Enlarged Hearts & Valve Replacements   • GERD (gastroesophageal reflux disease)    • History of chest x-ray 10/5/18-[facility]    Stable but Small L Apical Pneumothorax   • History of echocardiogram 03/23/16-[facility]    Mild Concentrive L Ventricular Hypertrophy; Moderate Aortic Reg; Mild Mitral Reg   • HLD (hyperlipidemia)    • Hx of chest x-ray 10/3/18-[facility]    Small L Apical Pneumothorax    • Hypertension    • Hypogonadism in male    • Hypokalemia    • Left ventricular hypertrophy by electrocardiogram     in the past by echo- mild.    • Mild concentric left ventricular hypertrophy 03/23/2016    Noted on Echo   • Pelvis tilted    • Pneumothorax 10/05/2018    Small Noted on Chest XR   • Polycythemia    • SVT (supraventricular tachycardia) (CMS/HCC) Hx    S/p Ablation in 2006         Past Surgical History:   Procedure Laterality Date   • APPENDECTOMY  1990   • CARDIAC ABLATION  2006   • CARDIAC CATHETERIZATION Left 10/2/18-[facility]    w/Arteriography/Angiography-Dr. Hamlin--100% Occulsion of RCA; Significant L Main Stenosis   • CHOLECYSTECTOMY  2004   • CORONARY ARTERY BYPASS GRAFT  10/03/2018    urgent X4   Dr Farris           Current Outpatient Medications:   •  aspirin 81 MG chewable tablet, Chew 81 mg Daily., Disp: , Rfl:   •  carvedilol (COREG) 25 MG tablet, Take 1 tablet by mouth 2 (Two) Times a Day., Disp: 60 tablet, Rfl: 5  •  clopidogrel (PLAVIX) 75 MG tablet, Take 1 tablet by mouth Daily., Disp: 90 tablet, Rfl: 1  •  Copper Gluconate (COPPER CAPS) 2 MG capsule, Take  by mouth., Disp: , Rfl:   •  famotidine (PEPCID) 40 MG tablet, Take 1 tablet by mouth 2 (Two) Times a Day., Disp: 180 tablet, Rfl: 3  •  pravastatin (PRAVACHOL) 80 MG tablet, Take 1 tablet by mouth every night at bedtime., Disp: 30 tablet, Rfl: 5  •  Testosterone Cypionate (DEPO-TESTOSTERONE) 200 MG/ML injection, Inject  into the appropriate muscle as directed by prescriber Every 14 (Fourteen) Days. 0.2 mL IM  "or SQ every other day., Disp: , Rfl:       Social History     Socioeconomic History   • Marital status:      Spouse name: Fely   • Number of children: Not on file   • Years of education: Not on file   • Highest education level: Not on file   Tobacco Use   • Smoking status: Never Smoker   • Smokeless tobacco: Never Used   Substance and Sexual Activity   • Alcohol use: No   • Drug use: No   • Sexual activity: Defer         Review of Systems   Constitution: Negative for chills and fever.   HENT: Negative for ear discharge and nosebleeds.    Eyes: Negative for discharge and redness.   Cardiovascular: Negative for chest pain, orthopnea, palpitations, paroxysmal nocturnal dyspnea and syncope.   Respiratory: Negative for cough, shortness of breath and wheezing.    Endocrine: Negative for heat intolerance.   Skin: Negative for rash.   Musculoskeletal: Negative for arthritis and myalgias.   Gastrointestinal: Negative for abdominal pain, melena, nausea and vomiting.   Genitourinary: Negative for dysuria and hematuria.   Neurological: Negative for dizziness, light-headedness, numbness and tremors.   Psychiatric/Behavioral: Negative for depression. The patient is not nervous/anxious.        Procedures    Procedures    No orders to display           Objective:    /86   Pulse 58   Ht 182.9 cm (72.01\")   Wt 78.9 kg (174 lb)   SpO2 99%   BMI 23.59 kg/m²         Physical Exam   Constitutional: He is oriented to person, place, and time. He appears well-developed and well-nourished.   HENT:   Head: Normocephalic and atraumatic.   Eyes: Right eye exhibits no discharge. No scleral icterus.   Neck: No thyromegaly present.   Cardiovascular: Normal rate, regular rhythm and normal heart sounds. Exam reveals no gallop and no friction rub.   No murmur heard.  Pulmonary/Chest: Effort normal and breath sounds normal. No respiratory distress. He has no wheezes. He has no rales.   Abdominal: There is no tenderness. "   Musculoskeletal: He exhibits no edema.   Lymphadenopathy:     He has no cervical adenopathy.   Neurological: He is alert and oriented to person, place, and time.   Skin: No rash noted. No erythema.   Psychiatric: He has a normal mood and affect.           Assessment:       Diagnosis Plan   1. Coronary artery disease involving native coronary artery of native heart without angina pectoris     2. Paroxysmal atrial fibrillation (CMS/HCC)     3. Essential hypertension              Plan:       Patient is doing well.  I will see the patient in 9 to 12 months.

## 2020-09-16 ENCOUNTER — TELEPHONE (OUTPATIENT)
Dept: FAMILY MEDICINE CLINIC | Facility: CLINIC | Age: 55
End: 2020-09-16

## 2020-09-16 NOTE — TELEPHONE ENCOUNTER
Poison ivy is treated with steroids.  If he is sure that is what it is, I will send in a tapering treatment course of prednisone.  (Let me know) I will be out of the office the rest of this week, so if he gets worse or if he has any concern for infection developing, then I would recommend he would have someone at an urgent care take a look at it.  Thanks

## 2020-09-16 NOTE — TELEPHONE ENCOUNTER
Attempted to call patient, no answer,if patient returns call he will need to go to [facility] if still having issues due to dr guerrero being out of office

## 2020-09-17 NOTE — TELEPHONE ENCOUNTER
Spoke w/patient, he feels like he is getting better. Told him if gets worse, can go to urgent care. He voiced understanding & does have an appt. Next week w/Dr. Turner

## 2020-09-23 ENCOUNTER — OFFICE VISIT (OUTPATIENT)
Dept: FAMILY MEDICINE CLINIC | Facility: CLINIC | Age: 55
End: 2020-09-23

## 2020-09-23 VITALS
HEART RATE: 63 BPM | HEIGHT: 72 IN | WEIGHT: 164.2 LBS | OXYGEN SATURATION: 99 % | SYSTOLIC BLOOD PRESSURE: 129 MMHG | TEMPERATURE: 96.9 F | DIASTOLIC BLOOD PRESSURE: 84 MMHG | BODY MASS INDEX: 22.24 KG/M2

## 2020-09-23 DIAGNOSIS — K21.9 GASTROESOPHAGEAL REFLUX DISEASE, ESOPHAGITIS PRESENCE NOT SPECIFIED: ICD-10-CM

## 2020-09-23 DIAGNOSIS — I10 ESSENTIAL HYPERTENSION: ICD-10-CM

## 2020-09-23 DIAGNOSIS — I25.10 CORONARY ARTERY DISEASE INVOLVING NATIVE CORONARY ARTERY OF NATIVE HEART WITHOUT ANGINA PECTORIS: ICD-10-CM

## 2020-09-23 DIAGNOSIS — E78.2 MIXED HYPERLIPIDEMIA: ICD-10-CM

## 2020-09-23 DIAGNOSIS — Z00.00 PHYSICAL EXAM, ANNUAL: Primary | ICD-10-CM

## 2020-09-23 PROCEDURE — 99396 PREV VISIT EST AGE 40-64: CPT | Performed by: FAMILY MEDICINE

## 2020-09-23 NOTE — PROGRESS NOTES
Subjective   Emmanuel Carpenter is a 55 y.o. male.   Chief Complaint   Patient presents with   • Annual Exam       History of Present Illness   Patient here today for physical exam. Patient brought form with him today.  Comes in today to have a physical exam done, specifically to complete a form for the online [facility] clinic that he goes to to get testosterone prescribed to him.  I have declined to take over that on several occasions.  In the past he has taken growth hormone therapy and testosterone doses that required phlebotomy to control his hematocrit.  He tells me today that the only thing he is taking is IM testosterone now.  He has known coronary disease, high cholesterol.  He continues on Plavix, aspirin, Coreg, Pravachol, and Pepcid for symptoms of GERD.  He otherwise has no other complaints today.      Patient Active Problem List    Diagnosis Date Noted   • Recurrent acute suppurative otitis media of right ear without spontaneous rupture of tympanic membrane 05/13/2020   • Mixed hyperlipidemia 04/22/2020   • Syncope and collapse 12/13/2019   • S/P CABG (coronary artery bypass graft) 12/13/2019   • Essential hypertension 10/22/2019     Note Last Updated: 10/22/2019     Patient's log blood pressures been reviewed has periods of systolic blood pressures in the 140s and diastolics greater than 80s     • Gastroesophageal reflux disease 11/13/2018   • Coronary artery disease involving native coronary artery of native heart without angina pectoris 11/13/2018     Note Last Updated: 9/26/2019     S/p CABG     • Paroxysmal atrial fibrillation (CMS/[facility]) 11/13/2018     Note Last Updated: 9/23/2020     Post-op A-fib.  S/p ablation.  No sustained a-fib for years.     • Shortness of breath 08/29/2018   • Varicose veins of bilateral lower extremities with other complications 06/03/2016   • Anxiety 01/12/2015   • Erythrocytosis 01/12/2015   • Primary male hypogonadism 01/12/2015   • Hypokalemia 08/29/2013   • Other specified  disorders of adrenal gland (CMS/[facility]) 08/01/2013   • Male hypogonadism 07/19/2013           Past Surgical History:   Procedure Laterality Date   • APPENDECTOMY  1990   • CARDIAC ABLATION  2006   • CARDIAC CATHETERIZATION Left 10/2/18-[facility]    w/Arteriography/Angiography-Dr. Hamlin--100% Occulsion of RCA; Significant L Main Stenosis   • CHOLECYSTECTOMY  2004   • CORONARY ARTERY BYPASS GRAFT  10/03/2018    urgent X4   Dr Farris     Current Outpatient Medications on File Prior to Visit   Medication Sig   • aspirin 81 MG chewable tablet Chew 81 mg Daily.   • carvedilol (COREG) 25 MG tablet Take 1 tablet by mouth 2 (Two) Times a Day.   • clopidogrel (PLAVIX) 75 MG tablet Take 1 tablet by mouth Daily.   • Copper Gluconate (COPPER CAPS) 2 MG capsule Take  by mouth.   • famotidine (PEPCID) 40 MG tablet Take 1 tablet by mouth 2 (Two) Times a Day.   • pravastatin (PRAVACHOL) 80 MG tablet Take 1 tablet by mouth every night at bedtime.   • Testosterone Cypionate (DEPO-TESTOSTERONE) 200 MG/ML injection Inject  into the appropriate muscle as directed by prescriber Every 14 (Fourteen) Days. 0.2 mL IM or SQ every other day.     No current facility-administered medications on file prior to visit.      Allergies   Allergen Reactions   • Other Other (See Comments)     Perfumes      Social History     Socioeconomic History   • Marital status:      Spouse name: Fely   • Number of children: Not on file   • Years of education: Not on file   • Highest education level: Not on file   Tobacco Use   • Smoking status: Never Smoker   • Smokeless tobacco: Never Used   Substance and Sexual Activity   • Alcohol use: No   • Drug use: No   • Sexual activity: Defer     Family History   Problem Relation Age of Onset   • Heart defect Maternal Grandmother         Enlarged Heart   • Atrial fibrillation Maternal Grandmother    • Diabetes Maternal Grandmother    • Hypertension Mother    • Melanoma Father    • Diabetes Father    • Heart disease Maternal  "Uncle    • Diabetes Paternal Grandmother      The following portions of the patient's history were reviewed and updated as appropriate: allergies, current medications, past family history, past medical history, past social history, past surgical history and problem list.    Review of Systems   Constitutional: Negative for chills and fever.   HENT: Negative for sore throat.    Eyes: Negative for visual disturbance.   Respiratory: Negative for cough, shortness of breath and wheezing.    Cardiovascular: Negative for chest pain, palpitations and leg swelling.   Gastrointestinal: Negative for abdominal pain, diarrhea, nausea and vomiting.   Musculoskeletal: Negative for gait problem and myalgias.   Skin: Negative for rash.   Neurological: Negative for headache and confusion.   Hematological: Does not bruise/bleed easily.       Objective   /84 (BP Location: Right arm, Patient Position: Sitting, Cuff Size: Adult)   Pulse 63   Temp 96.9 °F (36.1 °C) (Infrared)   Ht 182.9 cm (72.01\")   Wt 74.5 kg (164 lb 3.2 oz)   SpO2 99%   BMI 22.26 kg/m²   Physical Exam  Constitutional:       General: He is not in acute distress.     Appearance: He is well-developed and normal weight.      Comments: Wearing a face mask     HENT:      Head: Normocephalic and atraumatic.   Eyes:      Conjunctiva/sclera: Conjunctivae normal.   Neck:      Musculoskeletal: Normal range of motion.   Cardiovascular:      Rate and Rhythm: Normal rate and regular rhythm.      Heart sounds: No murmur.   Pulmonary:      Effort: Pulmonary effort is normal. No respiratory distress.      Breath sounds: Normal breath sounds.   Abdominal:      General: Abdomen is flat. There is no distension.      Palpations: Abdomen is soft.      Comments: No pulsatile midline mass   Musculoskeletal: Normal range of motion.   Skin:     General: Skin is warm and dry.      Findings: No rash.   Neurological:      Mental Status: He is alert and oriented to person, place, and " time.   Psychiatric:         Behavior: Behavior normal.                 Assessment/Plan   Diagnoses and all orders for this visit:    1. Physical exam, annual (Primary)    2. Coronary artery disease involving native coronary artery of native heart without angina pectoris  -     Comprehensive Metabolic Panel  -     CBC & Differential  -     Lipid Panel    Exam is essentially unremarkable.  I filled out the physical and history form for him to fax or mail to the online clinic he uses.  We did some basic labs as above to screen for diabetes, renal disease, liver disease, polycythemia and to monitor his cholesterol because of his coronary artery disease.  I discussed preventive vaccines including influenza vaccine and pneumonia vaccine.  He wishes to research those a bit before he decides to take them.  I have asked him to let me know after is made his decision and I will be happy to arrange this.             Return in about 1 year (around 9/23/2021), or if symptoms worsen or fail to improve.    Call with any problems or concerns before next visit

## 2020-09-30 ENCOUNTER — TELEPHONE (OUTPATIENT)
Dept: CARDIOLOGY | Facility: CLINIC | Age: 55
End: 2020-09-30

## 2020-09-30 RX ORDER — PRAVASTATIN SODIUM 80 MG/1
80 TABLET ORAL
Qty: 90 TABLET | Refills: 3 | Status: SHIPPED | OUTPATIENT
Start: 2020-09-30 | End: 2021-03-17

## 2020-09-30 RX ORDER — CLOPIDOGREL BISULFATE 75 MG/1
75 TABLET ORAL DAILY
Qty: 90 TABLET | Refills: 1 | Status: SHIPPED | OUTPATIENT
Start: 2020-09-30 | End: 2020-11-20

## 2020-09-30 RX ORDER — FAMOTIDINE 40 MG/1
40 TABLET, FILM COATED ORAL 2 TIMES DAILY
Qty: 180 TABLET | Refills: 3 | Status: SHIPPED | OUTPATIENT
Start: 2020-09-30 | End: 2021-10-11

## 2020-10-08 LAB
ALBUMIN SERPL-MCNC: 4.8 G/DL (ref 3.8–4.9)
ALBUMIN/GLOB SERPL: 1.9 {RATIO} (ref 1.2–2.2)
ALP SERPL-CCNC: 68 IU/L (ref 39–117)
ALT SERPL-CCNC: 17 IU/L (ref 0–44)
AST SERPL-CCNC: 19 IU/L (ref 0–40)
BASOPHILS # BLD AUTO: 0.1 X10E3/UL (ref 0–0.2)
BASOPHILS NFR BLD AUTO: 1 %
BILIRUB SERPL-MCNC: 1.2 MG/DL (ref 0–1.2)
BUN SERPL-MCNC: 12 MG/DL (ref 6–24)
BUN/CREAT SERPL: 12 (ref 9–20)
CALCIUM SERPL-MCNC: 9.5 MG/DL (ref 8.7–10.2)
CHLORIDE SERPL-SCNC: 100 MMOL/L (ref 96–106)
CHOLEST SERPL-MCNC: 113 MG/DL (ref 100–199)
CO2 SERPL-SCNC: 27 MMOL/L (ref 20–29)
CREAT SERPL-MCNC: 0.97 MG/DL (ref 0.76–1.27)
EOSINOPHIL # BLD AUTO: 0.1 X10E3/UL (ref 0–0.4)
EOSINOPHIL NFR BLD AUTO: 2 %
ERYTHROCYTE [DISTWIDTH] IN BLOOD BY AUTOMATED COUNT: 15.1 % (ref 11.6–15.4)
GLOBULIN SER CALC-MCNC: 2.5 G/DL (ref 1.5–4.5)
GLUCOSE SERPL-MCNC: 78 MG/DL (ref 65–99)
HCT VFR BLD AUTO: 52.7 % (ref 37.5–51)
HDLC SERPL-MCNC: 42 MG/DL
HGB BLD-MCNC: 17.6 G/DL (ref 13–17.7)
IMM GRANULOCYTES # BLD AUTO: 0 X10E3/UL (ref 0–0.1)
IMM GRANULOCYTES NFR BLD AUTO: 0 %
LDLC SERPL CALC-MCNC: 56 MG/DL (ref 0–99)
LYMPHOCYTES # BLD AUTO: 1 X10E3/UL (ref 0.7–3.1)
LYMPHOCYTES NFR BLD AUTO: 16 %
MCH RBC QN AUTO: 31.3 PG (ref 26.6–33)
MCHC RBC AUTO-ENTMCNC: 33.4 G/DL (ref 31.5–35.7)
MCV RBC AUTO: 94 FL (ref 79–97)
MONOCYTES # BLD AUTO: 0.5 X10E3/UL (ref 0.1–0.9)
MONOCYTES NFR BLD AUTO: 8 %
NEUTROPHILS # BLD AUTO: 4.7 X10E3/UL (ref 1.4–7)
NEUTROPHILS NFR BLD AUTO: 73 %
PLATELET # BLD AUTO: 189 X10E3/UL (ref 150–450)
POTASSIUM SERPL-SCNC: 4.6 MMOL/L (ref 3.5–5.2)
PROT SERPL-MCNC: 7.3 G/DL (ref 6–8.5)
RBC # BLD AUTO: 5.63 X10E6/UL (ref 4.14–5.8)
SODIUM SERPL-SCNC: 140 MMOL/L (ref 134–144)
TRIGL SERPL-MCNC: 74 MG/DL (ref 0–149)
VLDLC SERPL CALC-MCNC: 15 MG/DL (ref 5–40)
WBC # BLD AUTO: 6.4 X10E3/UL (ref 3.4–10.8)

## 2020-10-21 DIAGNOSIS — I10 ESSENTIAL HYPERTENSION: ICD-10-CM

## 2020-10-21 RX ORDER — CARVEDILOL 25 MG/1
TABLET ORAL
Qty: 60 TABLET | Refills: 5 | Status: SHIPPED | OUTPATIENT
Start: 2020-10-21 | End: 2021-04-13

## 2020-10-22 RX ORDER — CARVEDILOL 25 MG/1
25 TABLET ORAL 2 TIMES DAILY
Qty: 60 TABLET | Refills: 5 | OUTPATIENT
Start: 2020-10-22

## 2020-11-20 RX ORDER — CLOPIDOGREL BISULFATE 75 MG/1
75 TABLET ORAL DAILY
Qty: 90 TABLET | Refills: 1 | Status: SHIPPED | OUTPATIENT
Start: 2020-11-20 | End: 2021-06-11

## 2021-01-04 ENCOUNTER — OFFICE VISIT (OUTPATIENT)
Dept: FAMILY MEDICINE CLINIC | Facility: CLINIC | Age: 56
End: 2021-01-04

## 2021-01-04 VITALS
DIASTOLIC BLOOD PRESSURE: 82 MMHG | HEART RATE: 79 BPM | BODY MASS INDEX: 23.43 KG/M2 | OXYGEN SATURATION: 98 % | HEIGHT: 72 IN | TEMPERATURE: 97.8 F | SYSTOLIC BLOOD PRESSURE: 130 MMHG | WEIGHT: 173 LBS

## 2021-01-04 DIAGNOSIS — J01.10 ACUTE NON-RECURRENT FRONTAL SINUSITIS: Primary | ICD-10-CM

## 2021-01-04 DIAGNOSIS — J30.9 ALLERGIC RHINITIS, UNSPECIFIED SEASONALITY, UNSPECIFIED TRIGGER: ICD-10-CM

## 2021-01-04 PROCEDURE — 99213 OFFICE O/P EST LOW 20 MIN: CPT | Performed by: NURSE PRACTITIONER

## 2021-01-04 RX ORDER — FLUTICASONE PROPIONATE 50 MCG
2 SPRAY, SUSPENSION (ML) NASAL DAILY
Qty: 1 BOTTLE | Refills: 1 | Status: SHIPPED | OUTPATIENT
Start: 2021-01-04 | End: 2021-01-18

## 2021-01-04 RX ORDER — AZITHROMYCIN 250 MG/1
TABLET, FILM COATED ORAL
Qty: 6 TABLET | Refills: 0 | Status: SHIPPED | OUTPATIENT
Start: 2021-01-04 | End: 2021-01-18

## 2021-01-04 RX ORDER — PSEUDOEPHEDRINE HCL 30 MG
30 TABLET ORAL EVERY 4 HOURS PRN
Qty: 30 TABLET | Refills: 1 | Status: SHIPPED | OUTPATIENT
Start: 2021-01-04 | End: 2021-01-18

## 2021-01-04 NOTE — PATIENT INSTRUCTIONS
Take antibiotics as directed with food until gone  Allergy medication as directed  If no improvement by Thursday call office

## 2021-01-04 NOTE — PROGRESS NOTES
"    Emmanuel Carpenter is a 55 y.o. male.     55-year-old white male with history of CABG, hyperlipidemia, hypertension and GERD who comes in today with 1 week complaint of bilateral ear with muffled ear sounds and pressure around eyes.  Exam reveals allergic rhinitis.  He denies any fatigue fever or sore throat.  I am placing him on allergy medication as a Z-Alejandro if symptoms do not improve by Thursday he is to call the office for Covid test blood pressure 130/82 heart rate 78 he denies any chest pain, dyspnea, tachycardia or dizziness  Weight is 173 with a BMI of 23.5     Z-Alejandro  Zyrtec/Flonase/Sudafed 30 3 times daily  If symptoms have not improved by Thursday call office             The following portions of the patient's history were reviewed and updated as appropriate: allergies, current medications, past family history, past medical history, past social history, past surgical history and problem list.    Vitals:    01/04/21 1502   BP: 130/82   BP Location: Right arm   Patient Position: Sitting   Cuff Size: Adult   Pulse: 79   Temp: 97.8 °F (36.6 °C)   TempSrc: Temporal   SpO2: 98%   Weight: 78.5 kg (173 lb)   Height: 182.9 cm (72\")     Body mass index is 23.46 kg/m².    Past Medical History:   Diagnosis Date   • Actinic keratosis    • Acute kidney injury (CMS/HCC) 10/03/2018    Consulted by Dr. Nunez   • Aortic regurgitation 2016   • Aortic stenosis 10/02/2018    L Main Noted on Cardiac Cath   • Atrial fibrillation (CMS/HCC)    • Bronchitis    • Cataract    • Detached retina    • Family history of heart disease     Enlarged Hearts & Valve Replacements   • GERD (gastroesophageal reflux disease)    • History of chest x-ray 10/5/18-[facility]    Stable but Small L Apical Pneumothorax   • History of echocardiogram 03/23/16-[facility]    Mild Concentrive L Ventricular Hypertrophy; Moderate Aortic Reg; Mild Mitral Reg   • HLD (hyperlipidemia)    • Hx of chest x-ray 10/3/18-[facility]    Small L Apical Pneumothorax    • Hypertension    • " Hypogonadism in male    • Hypokalemia    • Left ventricular hypertrophy by electrocardiogram     in the past by echo- mild.    • Mild concentric left ventricular hypertrophy 03/23/2016    Noted on Echo   • Pelvis tilted    • Pneumothorax 10/05/2018    Small Noted on Chest XR   • Polycythemia    • SVT (supraventricular tachycardia) (CMS/HCC) Hx    S/p Ablation in 2006     Past Surgical History:   Procedure Laterality Date   • APPENDECTOMY  1990   • CARDIAC ABLATION  2006   • CARDIAC CATHETERIZATION Left 10/2/18-[facility]    w/Arteriography/Angiography-Dr. Hamlin--100% Occulsion of RCA; Significant L Main Stenosis   • CHOLECYSTECTOMY  2004   • CORONARY ARTERY BYPASS GRAFT  10/03/2018    urgent X4   Dr Farris     Family History   Problem Relation Age of Onset   • Heart defect Maternal Grandmother         Enlarged Heart   • Atrial fibrillation Maternal Grandmother    • Diabetes Maternal Grandmother    • Hypertension Mother    • Melanoma Father    • Diabetes Father    • Heart disease Maternal Uncle    • Diabetes Paternal Grandmother        There is no immunization history on file for this patient.    Office Visit on 09/23/2020   Component Date Value Ref Range Status   • Glucose 10/07/2020 78  65 - 99 mg/dL Final   • BUN 10/07/2020 12  6 - 24 mg/dL Final   • Creatinine 10/07/2020 0.97  0.76 - 1.27 mg/dL Final   • eGFR Non  Am 10/07/2020 89  >59 mL/min/1.73 Final   • eGFR African Am 10/07/2020 103  >59 mL/min/1.73 Final   • BUN/Creatinine Ratio 10/07/2020 12  9 - 20 Final   • Sodium 10/07/2020 140  134 - 144 mmol/L Final   • Potassium 10/07/2020 4.6  3.5 - 5.2 mmol/L Final   • Chloride 10/07/2020 100  96 - 106 mmol/L Final   • Total CO2 10/07/2020 27  20 - 29 mmol/L Final   • Calcium 10/07/2020 9.5  8.7 - 10.2 mg/dL Final   • Total Protein 10/07/2020 7.3  6.0 - 8.5 g/dL Final   • Albumin 10/07/2020 4.8  3.8 - 4.9 g/dL Final   • Globulin 10/07/2020 2.5  1.5 - 4.5 g/dL Final   • A/G Ratio 10/07/2020 1.9  1.2 - 2.2 Final   •  Total Bilirubin 10/07/2020 1.2  0.0 - 1.2 mg/dL Final   • Alkaline Phosphatase 10/07/2020 68  39 - 117 IU/L Final   • AST (SGOT) 10/07/2020 19  0 - 40 IU/L Final   • ALT (SGPT) 10/07/2020 17  0 - 44 IU/L Final   • WBC 10/07/2020 6.4  3.4 - 10.8 x10E3/uL Final   • RBC 10/07/2020 5.63  4.14 - 5.80 x10E6/uL Final   • Hemoglobin 10/07/2020 17.6  13.0 - 17.7 g/dL Final    **Verified by repeat analysis**   • Hematocrit 10/07/2020 52.7* 37.5 - 51.0 % Final   • MCV 10/07/2020 94  79 - 97 fL Final   • MCH 10/07/2020 31.3  26.6 - 33.0 pg Final   • MCHC 10/07/2020 33.4  31.5 - 35.7 g/dL Final   • RDW 10/07/2020 15.1  11.6 - 15.4 % Final   • Platelets 10/07/2020 189  150 - 450 x10E3/uL Final   • Neutrophil Rel % 10/07/2020 73  Not Estab. % Final   • Lymphocyte Rel % 10/07/2020 16  Not Estab. % Final   • Monocyte Rel % 10/07/2020 8  Not Estab. % Final   • Eosinophil Rel % 10/07/2020 2  Not Estab. % Final   • Basophil Rel % 10/07/2020 1  Not Estab. % Final   • Neutrophils Absolute 10/07/2020 4.7  1.4 - 7.0 x10E3/uL Final   • Lymphocytes Absolute 10/07/2020 1.0  0.7 - 3.1 x10E3/uL Final   • Monocytes Absolute 10/07/2020 0.5  0.1 - 0.9 x10E3/uL Final   • Eosinophils Absolute 10/07/2020 0.1  0.0 - 0.4 x10E3/uL Final   • Basophils Absolute 10/07/2020 0.1  0.0 - 0.2 x10E3/uL Final   • Immature Granulocyte Rel % 10/07/2020 0  Not Estab. % Final   • Immature Grans Absolute 10/07/2020 0.0  0.0 - 0.1 x10E3/uL Final   • Total Cholesterol 10/07/2020 113  100 - 199 mg/dL Final   • Triglycerides 10/07/2020 74  0 - 149 mg/dL Final   • HDL Cholesterol 10/07/2020 42  >39 mg/dL Final   • VLDL Cholesterol Mike 10/07/2020 15  5 - 40 mg/dL Final   • LDL Chol Calc ([facility]) 10/07/2020 56  0 - 99 mg/dL Final         Review of Systems   Constitutional: Negative.    HENT: Positive for congestion, hearing loss and sinus pressure.    Respiratory: Negative.    Cardiovascular: Negative.    Gastrointestinal: Negative.    Genitourinary: Negative.     Musculoskeletal: Negative.    Skin: Negative.    Neurological: Negative.    Psychiatric/Behavioral: Negative.        Objective   Physical Exam  Constitutional:       Appearance: Normal appearance.   HENT:      Head: Normocephalic.      Ears:      Comments: Bulging TMs partial obstruction due to earwax     Mouth/Throat:      Comments: Heavy postnasal drip throat slightly irritated  Neck:      Musculoskeletal: Normal range of motion.   Cardiovascular:      Rate and Rhythm: Normal rate and regular rhythm.      Pulses: Normal pulses.      Heart sounds: Normal heart sounds.   Pulmonary:      Effort: Pulmonary effort is normal.      Breath sounds: Normal breath sounds.   Abdominal:      General: Bowel sounds are normal.   Musculoskeletal: Normal range of motion.   Skin:     General: Skin is warm and dry.   Neurological:      General: No focal deficit present.      Mental Status: He is alert and oriented to person, place, and time.   Psychiatric:         Mood and Affect: Mood normal.         Behavior: Behavior normal.         Procedures    Assessment/Plan   Diagnoses and all orders for this visit:    1. Acute non-recurrent frontal sinusitis (Primary)    2. Allergic rhinitis, unspecified seasonality, unspecified trigger    Other orders  -     pseudoephedrine (Sudafed) 30 MG tablet; Take 1 tablet by mouth Every 4 (Four) Hours As Needed for Congestion.  Dispense: 30 tablet; Refill: 1  -     fluticasone (FLONASE) 50 MCG/ACT nasal spray; 2 sprays into the nostril(s) as directed by provider Daily.  Dispense: 1 bottle; Refill: 1  -     azithromycin (Zithromax Z-Alejandro) 250 MG tablet; Take 2 tablets the first day, then 1 tablet daily for 4 days.  Dispense: 6 tablet; Refill: 0          Current Outpatient Medications:   •  aspirin 81 MG chewable tablet, Chew 81 mg Daily., Disp: , Rfl:   •  carvedilol (COREG) 25 MG tablet, TAKE 1 TABLET BY MOUTH TWICE A DAY, Disp: 60 tablet, Rfl: 5  •  clopidogrel (PLAVIX) 75 MG tablet, TAKE 1 TABLET  BY MOUTH DAILY, Disp: 90 tablet, Rfl: 1  •  Copper Gluconate (COPPER CAPS) 2 MG capsule, Take  by mouth., Disp: , Rfl:   •  famotidine (Pepcid) 40 MG tablet, Take 1 tablet by mouth 2 (Two) Times a Day., Disp: 180 tablet, Rfl: 3  •  pravastatin (PRAVACHOL) 80 MG tablet, Take 1 tablet by mouth every night at bedtime., Disp: 90 tablet, Rfl: 3  •  Testosterone Cypionate (DEPO-TESTOSTERONE) 200 MG/ML injection, Inject  into the appropriate muscle as directed by prescriber Every 14 (Fourteen) Days. 0.2 mL IM or SQ every other day., Disp: , Rfl:   •  azithromycin (Zithromax Z-Alejandro) 250 MG tablet, Take 2 tablets the first day, then 1 tablet daily for 4 days., Disp: 6 tablet, Rfl: 0  •  fluticasone (FLONASE) 50 MCG/ACT nasal spray, 2 sprays into the nostril(s) as directed by provider Daily., Disp: 1 bottle, Rfl: 1  •  pseudoephedrine (Sudafed) 30 MG tablet, Take 1 tablet by mouth Every 4 (Four) Hours As Needed for Congestion., Disp: 30 tablet, Rfl: 1

## 2021-01-05 ENCOUNTER — TELEPHONE (OUTPATIENT)
Dept: CARDIOLOGY | Facility: CLINIC | Age: 56
End: 2021-01-05

## 2021-01-05 NOTE — TELEPHONE ENCOUNTER
Is it ok to take Pseudoephedrine hcl 30 mg 3x daily for congestion  with the medications he is on  His primary care has prescribed this

## 2021-01-18 ENCOUNTER — OFFICE VISIT (OUTPATIENT)
Dept: FAMILY MEDICINE CLINIC | Facility: CLINIC | Age: 56
End: 2021-01-18

## 2021-01-18 VITALS
BODY MASS INDEX: 23.11 KG/M2 | HEIGHT: 72 IN | WEIGHT: 170.6 LBS | OXYGEN SATURATION: 100 % | DIASTOLIC BLOOD PRESSURE: 84 MMHG | HEART RATE: 67 BPM | SYSTOLIC BLOOD PRESSURE: 129 MMHG | TEMPERATURE: 97.3 F

## 2021-01-18 DIAGNOSIS — H61.23 BILATERAL IMPACTED CERUMEN: ICD-10-CM

## 2021-01-18 DIAGNOSIS — I10 ESSENTIAL HYPERTENSION: Primary | ICD-10-CM

## 2021-01-18 DIAGNOSIS — J30.9 ALLERGIC RHINITIS, UNSPECIFIED SEASONALITY, UNSPECIFIED TRIGGER: ICD-10-CM

## 2021-01-18 DIAGNOSIS — I48.0 PAROXYSMAL ATRIAL FIBRILLATION (HCC): ICD-10-CM

## 2021-01-18 PROCEDURE — 99213 OFFICE O/P EST LOW 20 MIN: CPT | Performed by: NURSE PRACTITIONER

## 2021-01-18 PROCEDURE — 69209 REMOVE IMPACTED EAR WAX UNI: CPT | Performed by: NURSE PRACTITIONER

## 2021-01-18 NOTE — PROGRESS NOTES
"    Emmanuel Carpenter is a 55 y.o. male.     55-year-old white male with history of CABG, hyperlipidemia, hypertension and GERD who was here on January 4 with complaints of ear pressure and pressure behind his eyes.  Exam reveals allergic rhinitis only and I placed him on allergy medication and a Z-Alejandro.  Patient did not take allergy medicine except for 1 day and states he is not a lot better.  Patient states the Sudafed gave him palpitations I encouraged him to use Zyrtec and Flonase for a week.  He has absolutely no other symptoms.  I am going to irrigate ears today for cerumen and recheck TMs.  Patient has been having the same symptoms for 3 weeks  Blood pressure 128/84 heart rate 66 he denies any chest pain, dyspnea, tachycardia or dizziness    Bilateral ear irrigation  use Zyrtec and Flonase x1 week  If any other symptoms occur call office           The following portions of the patient's history were reviewed and updated as appropriate: allergies, current medications, past family history, past medical history, past social history, past surgical history and problem list.    Vitals:    01/18/21 0857   BP: 129/84   BP Location: Right arm   Patient Position: Sitting   Cuff Size: Adult   Pulse: 67   Temp: 97.3 °F (36.3 °C)   TempSrc: Temporal   SpO2: 100%   Weight: 77.4 kg (170 lb 9.6 oz)   Height: 182.9 cm (72\")     Body mass index is 23.14 kg/m².    Past Medical History:   Diagnosis Date   • Actinic keratosis    • Acute kidney injury (CMS/HCC) 10/03/2018    Consulted by Dr. Nunez   • Aortic regurgitation 2016   • Aortic stenosis 10/02/2018    L Main Noted on Cardiac Cath   • Atrial fibrillation (CMS/HCC)    • Bronchitis    • Cataract    • Detached retina    • Family history of heart disease     Enlarged Hearts & Valve Replacements   • GERD (gastroesophageal reflux disease)    • History of chest x-ray 10/5/18-[facility]    Stable but Small L Apical Pneumothorax   • History of echocardiogram 03/23/16-[facility]    Mild " Concentrive L Ventricular Hypertrophy; Moderate Aortic Reg; Mild Mitral Reg   • HLD (hyperlipidemia)    • Hx of chest x-ray 10/3/18-[facility]    Small L Apical Pneumothorax    • Hypertension    • Hypogonadism in male    • Hypokalemia    • Left ventricular hypertrophy by electrocardiogram     in the past by echo- mild.    • Mild concentric left ventricular hypertrophy 03/23/2016    Noted on Echo   • Pelvis tilted    • Pneumothorax 10/05/2018    Small Noted on Chest XR   • Polycythemia    • SVT (supraventricular tachycardia) (CMS/HCC) Hx    S/p Ablation in 2006     Past Surgical History:   Procedure Laterality Date   • APPENDECTOMY  1990   • CARDIAC ABLATION  2006   • CARDIAC CATHETERIZATION Left 10/2/18-[facility]    w/Arteriography/Angiography-Dr. Hamlin--100% Occulsion of RCA; Significant L Main Stenosis   • CHOLECYSTECTOMY  2004   • CORONARY ARTERY BYPASS GRAFT  10/03/2018    urgent X4   Dr Farris     Family History   Problem Relation Age of Onset   • Heart defect Maternal Grandmother         Enlarged Heart   • Atrial fibrillation Maternal Grandmother    • Diabetes Maternal Grandmother    • Hypertension Mother    • Melanoma Father    • Diabetes Father    • Heart disease Maternal Uncle    • Diabetes Paternal Grandmother        There is no immunization history on file for this patient.    Office Visit on 09/23/2020   Component Date Value Ref Range Status   • Glucose 10/07/2020 78  65 - 99 mg/dL Final   • BUN 10/07/2020 12  6 - 24 mg/dL Final   • Creatinine 10/07/2020 0.97  0.76 - 1.27 mg/dL Final   • eGFR Non  Am 10/07/2020 89  >59 mL/min/1.73 Final   • eGFR African Am 10/07/2020 103  >59 mL/min/1.73 Final   • BUN/Creatinine Ratio 10/07/2020 12  9 - 20 Final   • Sodium 10/07/2020 140  134 - 144 mmol/L Final   • Potassium 10/07/2020 4.6  3.5 - 5.2 mmol/L Final   • Chloride 10/07/2020 100  96 - 106 mmol/L Final   • Total CO2 10/07/2020 27  20 - 29 mmol/L Final   • Calcium 10/07/2020 9.5  8.7 - 10.2 mg/dL Final   • Total  Protein 10/07/2020 7.3  6.0 - 8.5 g/dL Final   • Albumin 10/07/2020 4.8  3.8 - 4.9 g/dL Final   • Globulin 10/07/2020 2.5  1.5 - 4.5 g/dL Final   • A/G Ratio 10/07/2020 1.9  1.2 - 2.2 Final   • Total Bilirubin 10/07/2020 1.2  0.0 - 1.2 mg/dL Final   • Alkaline Phosphatase 10/07/2020 68  39 - 117 IU/L Final   • AST (SGOT) 10/07/2020 19  0 - 40 IU/L Final   • ALT (SGPT) 10/07/2020 17  0 - 44 IU/L Final   • WBC 10/07/2020 6.4  3.4 - 10.8 x10E3/uL Final   • RBC 10/07/2020 5.63  4.14 - 5.80 x10E6/uL Final   • Hemoglobin 10/07/2020 17.6  13.0 - 17.7 g/dL Final    **Verified by repeat analysis**   • Hematocrit 10/07/2020 52.7* 37.5 - 51.0 % Final   • MCV 10/07/2020 94  79 - 97 fL Final   • MCH 10/07/2020 31.3  26.6 - 33.0 pg Final   • MCHC 10/07/2020 33.4  31.5 - 35.7 g/dL Final   • RDW 10/07/2020 15.1  11.6 - 15.4 % Final   • Platelets 10/07/2020 189  150 - 450 x10E3/uL Final   • Neutrophil Rel % 10/07/2020 73  Not Estab. % Final   • Lymphocyte Rel % 10/07/2020 16  Not Estab. % Final   • Monocyte Rel % 10/07/2020 8  Not Estab. % Final   • Eosinophil Rel % 10/07/2020 2  Not Estab. % Final   • Basophil Rel % 10/07/2020 1  Not Estab. % Final   • Neutrophils Absolute 10/07/2020 4.7  1.4 - 7.0 x10E3/uL Final   • Lymphocytes Absolute 10/07/2020 1.0  0.7 - 3.1 x10E3/uL Final   • Monocytes Absolute 10/07/2020 0.5  0.1 - 0.9 x10E3/uL Final   • Eosinophils Absolute 10/07/2020 0.1  0.0 - 0.4 x10E3/uL Final   • Basophils Absolute 10/07/2020 0.1  0.0 - 0.2 x10E3/uL Final   • Immature Granulocyte Rel % 10/07/2020 0  Not Estab. % Final   • Immature Grans Absolute 10/07/2020 0.0  0.0 - 0.1 x10E3/uL Final   • Total Cholesterol 10/07/2020 113  100 - 199 mg/dL Final   • Triglycerides 10/07/2020 74  0 - 149 mg/dL Final   • HDL Cholesterol 10/07/2020 42  >39 mg/dL Final   • VLDL Cholesterol Mike 10/07/2020 15  5 - 40 mg/dL Final   • LDL Chol Calc ([facility]) 10/07/2020 56  0 - 99 mg/dL Final         Review of Systems   Constitutional: Negative.     HENT: Positive for sinus pressure and tinnitus.    Respiratory: Negative.    Cardiovascular: Negative.    Genitourinary: Negative.    Musculoskeletal: Negative.    Skin: Negative.    Psychiatric/Behavioral: Negative.        Objective   Physical Exam  Constitutional:       Appearance: Normal appearance.   HENT:      Head: Normocephalic.      Right Ear: There is impacted cerumen.      Left Ear: There is impacted cerumen.      Ears:      Comments: Cerumen impaction     Mouth/Throat:      Comments: Postnasal drip  Cardiovascular:      Rate and Rhythm: Normal rate and regular rhythm.      Pulses: Normal pulses.      Heart sounds: Normal heart sounds.   Pulmonary:      Effort: Pulmonary effort is normal.      Breath sounds: Normal breath sounds.   Abdominal:      General: Bowel sounds are normal.   Musculoskeletal: Normal range of motion.   Skin:     General: Skin is warm.   Neurological:      General: No focal deficit present.      Mental Status: He is alert and oriented to person, place, and time.   Psychiatric:         Mood and Affect: Mood normal.         Behavior: Behavior normal.         Ear Cerumen Removal    Date/Time: 1/18/2021 9:35 AM  Performed by: Allison Smyth APRN  Authorized by: Allison Smyth APRN     Anesthesia:  Local Anesthetic: none  Location details: left ear and right ear  Patient tolerance: patient tolerated the procedure well with no immediate complications  Procedure type: irrigation   Sedation:  Patient sedated: no            Assessment/Plan   Diagnoses and all orders for this visit:    1. Essential hypertension (Primary)    2. Paroxysmal atrial fibrillation (CMS/HCC)    3. Allergic rhinitis, unspecified seasonality, unspecified trigger  -     Ambulatory Referral to ENT (Otolaryngology)          Current Outpatient Medications:   •  aspirin 81 MG chewable tablet, Chew 81 mg Daily., Disp: , Rfl:   •  carvedilol (COREG) 25 MG tablet, TAKE 1 TABLET BY MOUTH TWICE A DAY, Disp: 60 tablet, Rfl:  5  •  clopidogrel (PLAVIX) 75 MG tablet, TAKE 1 TABLET BY MOUTH DAILY, Disp: 90 tablet, Rfl: 1  •  Copper Gluconate (COPPER CAPS) 2 MG capsule, Take  by mouth., Disp: , Rfl:   •  famotidine (Pepcid) 40 MG tablet, Take 1 tablet by mouth 2 (Two) Times a Day., Disp: 180 tablet, Rfl: 3  •  pravastatin (PRAVACHOL) 80 MG tablet, Take 1 tablet by mouth every night at bedtime., Disp: 90 tablet, Rfl: 3  •  Testosterone Cypionate (DEPO-TESTOSTERONE) 200 MG/ML injection, Inject  into the appropriate muscle as directed by prescriber Every 14 (Fourteen) Days. 0.2 mL IM or SQ every other day., Disp: , Rfl:

## 2021-01-19 ENCOUNTER — TELEPHONE (OUTPATIENT)
Dept: FAMILY MEDICINE CLINIC | Facility: CLINIC | Age: 56
End: 2021-01-19

## 2021-03-17 ENCOUNTER — OFFICE VISIT (OUTPATIENT)
Dept: FAMILY MEDICINE CLINIC | Facility: CLINIC | Age: 56
End: 2021-03-17

## 2021-03-17 VITALS
SYSTOLIC BLOOD PRESSURE: 127 MMHG | DIASTOLIC BLOOD PRESSURE: 81 MMHG | WEIGHT: 176.6 LBS | HEART RATE: 64 BPM | HEIGHT: 72 IN | BODY MASS INDEX: 23.92 KG/M2 | OXYGEN SATURATION: 100 % | TEMPERATURE: 96.8 F

## 2021-03-17 DIAGNOSIS — E78.2 MIXED HYPERLIPIDEMIA: ICD-10-CM

## 2021-03-17 DIAGNOSIS — Z95.1 S/P CABG (CORONARY ARTERY BYPASS GRAFT): ICD-10-CM

## 2021-03-17 DIAGNOSIS — I25.10 CORONARY ARTERY DISEASE INVOLVING NATIVE CORONARY ARTERY OF NATIVE HEART WITHOUT ANGINA PECTORIS: Primary | ICD-10-CM

## 2021-03-17 PROCEDURE — 99214 OFFICE O/P EST MOD 30 MIN: CPT | Performed by: FAMILY MEDICINE

## 2021-03-17 RX ORDER — EZETIMIBE 10 MG/1
10 TABLET ORAL DAILY
Qty: 90 TABLET | Refills: 3 | Status: SHIPPED | OUTPATIENT
Start: 2021-03-17 | End: 2022-01-28 | Stop reason: SDUPTHER

## 2021-03-17 RX ORDER — SIMVASTATIN 40 MG
40 TABLET ORAL NIGHTLY
Qty: 90 TABLET | Refills: 3 | Status: SHIPPED | OUTPATIENT
Start: 2021-03-17 | End: 2022-01-28 | Stop reason: SDUPTHER

## 2021-03-17 NOTE — PROGRESS NOTES
Subjective   Emmanuel Carpenter is a 55 y.o. male.   Chief Complaint   Patient presents with   • Hyperlipidemia       History of Present Illness   Presents to the office today for what the schedule says is to discuss getting a different statin.  He has been on Pravachol 80 mg/day.  He was on this when I first saw him.  He has known coronary artery disease and is actually status post coronary artery bypass graft x4 in 2018.    Last lipid panel was October 2020 and was excellent.    He has done some research and he wants to try simvastatin.  He has also discovered ezetimibe and he feels this combination will lower his LDL back into the 40s where he would prefer it to be.  He also has raised some concerns about toxicity from the fluorine molecules and some of the other statins.    He also mentions that he has had some nosebleeds throughout the fall and when he has forgotten to take his aspirin he notices the nosebleeds stopped.  He questions whether he should stop the aspirin or the Plavix.      Patient Active Problem List    Diagnosis Date Noted   • Recurrent acute suppurative otitis media of right ear without spontaneous rupture of tympanic membrane 05/13/2020   • Mixed hyperlipidemia 04/22/2020   • Syncope and collapse 12/13/2019   • S/P CABG (coronary artery bypass graft) 12/13/2019   • Essential hypertension 10/22/2019     Note Last Updated: 10/22/2019     Patient's log blood pressures been reviewed has periods of systolic blood pressures in the 140s and diastolics greater than 80s     • Gastroesophageal reflux disease 11/13/2018   • Coronary artery disease involving native coronary artery of native heart without angina pectoris 11/13/2018     Note Last Updated: 9/26/2019     S/p CABG     • Paroxysmal atrial fibrillation (CMS/[facility]) 11/13/2018     Note Last Updated: 9/23/2020     Post-op A-fib.  S/p ablation.  No sustained a-fib for years.     • Shortness of breath 08/29/2018   • Varicose veins of bilateral lower  extremities with other complications 06/03/2016   • Anxiety 01/12/2015   • Erythrocytosis 01/12/2015   • Primary male hypogonadism 01/12/2015   • Hypokalemia 08/29/2013   • Other specified disorders of adrenal gland (CMS/HCC) 08/01/2013   • Male hypogonadism 07/19/2013           Past Surgical History:   Procedure Laterality Date   • APPENDECTOMY  1990   • CARDIAC ABLATION  2006   • CARDIAC CATHETERIZATION Left 10/2/18-[facility]    w/Arteriography/Angiography-Dr. Hamlin--100% Occulsion of RCA; Significant L Main Stenosis   • CHOLECYSTECTOMY  2004   • CORONARY ARTERY BYPASS GRAFT  10/03/2018    urgent X4   Dr Farris     Current Outpatient Medications on File Prior to Visit   Medication Sig   • carvedilol (COREG) 25 MG tablet TAKE 1 TABLET BY MOUTH TWICE A DAY   • clopidogrel (PLAVIX) 75 MG tablet TAKE 1 TABLET BY MOUTH DAILY   • Copper Gluconate (COPPER CAPS) 2 MG capsule Take  by mouth.   • famotidine (Pepcid) 40 MG tablet Take 1 tablet by mouth 2 (Two) Times a Day.   • Testosterone Cypionate (DEPO-TESTOSTERONE) 200 MG/ML injection Inject  into the appropriate muscle as directed by prescriber Every 14 (Fourteen) Days. 0.2 mL IM or SQ every other day.   • [DISCONTINUED] aspirin 81 MG chewable tablet Chew 81 mg Daily.   • [DISCONTINUED] pravastatin (PRAVACHOL) 80 MG tablet Take 1 tablet by mouth every night at bedtime.     No current facility-administered medications on file prior to visit.     Allergies   Allergen Reactions   • Other Other (See Comments)     Perfumes      Social History     Socioeconomic History   • Marital status:      Spouse name: Fely   • Number of children: Not on file   • Years of education: Not on file   • Highest education level: Not on file   Tobacco Use   • Smoking status: Never Smoker   • Smokeless tobacco: Never Used   Substance and Sexual Activity   • Alcohol use: No   • Drug use: No   • Sexual activity: Defer     Family History   Problem Relation Age of Onset   • Heart defect Maternal  "Grandmother         Enlarged Heart   • Atrial fibrillation Maternal Grandmother    • Diabetes Maternal Grandmother    • Hypertension Mother    • Melanoma Father    • Diabetes Father    • Heart disease Maternal Uncle    • Diabetes Paternal Grandmother        Review of Systems    Objective   /81 (BP Location: Right arm, Patient Position: Sitting, Cuff Size: Adult)   Pulse 64   Temp 96.8 °F (36 °C) (Infrared)   Ht 182.9 cm (72.01\")   Wt 80.1 kg (176 lb 9.6 oz)   SpO2 100%   BMI 23.95 kg/m²   Physical Exam  Constitutional:       Appearance: He is well-developed.      Comments: Wearing a face mask     HENT:      Head: Normocephalic and atraumatic.   Eyes:      Conjunctiva/sclera: Conjunctivae normal.   Cardiovascular:      Rate and Rhythm: Normal rate.   Pulmonary:      Effort: Pulmonary effort is normal.   Musculoskeletal:         General: Normal range of motion.      Cervical back: Normal range of motion.   Skin:     General: Skin is warm and dry.      Findings: No rash.   Neurological:      Mental Status: He is alert and oriented to person, place, and time.   Psychiatric:         Behavior: Behavior normal.           No visits with results within 4 Month(s) from this visit.   Latest known visit with results is:   Office Visit on 09/23/2020   Component Date Value Ref Range Status   • Glucose 10/07/2020 78  65 - 99 mg/dL Final   • BUN 10/07/2020 12  6 - 24 mg/dL Final   • Creatinine 10/07/2020 0.97  0.76 - 1.27 mg/dL Final   • eGFR Non  Am 10/07/2020 89  >59 mL/min/1.73 Final   • eGFR African Am 10/07/2020 103  >59 mL/min/1.73 Final   • BUN/Creatinine Ratio 10/07/2020 12  9 - 20 Final   • Sodium 10/07/2020 140  134 - 144 mmol/L Final   • Potassium 10/07/2020 4.6  3.5 - 5.2 mmol/L Final   • Chloride 10/07/2020 100  96 - 106 mmol/L Final   • Total CO2 10/07/2020 27  20 - 29 mmol/L Final   • Calcium 10/07/2020 9.5  8.7 - 10.2 mg/dL Final   • Total Protein 10/07/2020 7.3  6.0 - 8.5 g/dL Final   • Albumin " 10/07/2020 4.8  3.8 - 4.9 g/dL Final   • Globulin 10/07/2020 2.5  1.5 - 4.5 g/dL Final   • A/G Ratio 10/07/2020 1.9  1.2 - 2.2 Final   • Total Bilirubin 10/07/2020 1.2  0.0 - 1.2 mg/dL Final   • Alkaline Phosphatase 10/07/2020 68  39 - 117 IU/L Final   • AST (SGOT) 10/07/2020 19  0 - 40 IU/L Final   • ALT (SGPT) 10/07/2020 17  0 - 44 IU/L Final   • WBC 10/07/2020 6.4  3.4 - 10.8 x10E3/uL Final   • RBC 10/07/2020 5.63  4.14 - 5.80 x10E6/uL Final   • Hemoglobin 10/07/2020 17.6  13.0 - 17.7 g/dL Final    **Verified by repeat analysis**   • Hematocrit 10/07/2020 52.7* 37.5 - 51.0 % Final   • MCV 10/07/2020 94  79 - 97 fL Final   • MCH 10/07/2020 31.3  26.6 - 33.0 pg Final   • MCHC 10/07/2020 33.4  31.5 - 35.7 g/dL Final   • RDW 10/07/2020 15.1  11.6 - 15.4 % Final   • Platelets 10/07/2020 189  150 - 450 x10E3/uL Final   • Neutrophil Rel % 10/07/2020 73  Not Estab. % Final   • Lymphocyte Rel % 10/07/2020 16  Not Estab. % Final   • Monocyte Rel % 10/07/2020 8  Not Estab. % Final   • Eosinophil Rel % 10/07/2020 2  Not Estab. % Final   • Basophil Rel % 10/07/2020 1  Not Estab. % Final   • Neutrophils Absolute 10/07/2020 4.7  1.4 - 7.0 x10E3/uL Final   • Lymphocytes Absolute 10/07/2020 1.0  0.7 - 3.1 x10E3/uL Final   • Monocytes Absolute 10/07/2020 0.5  0.1 - 0.9 x10E3/uL Final   • Eosinophils Absolute 10/07/2020 0.1  0.0 - 0.4 x10E3/uL Final   • Basophils Absolute 10/07/2020 0.1  0.0 - 0.2 x10E3/uL Final   • Immature Granulocyte Rel % 10/07/2020 0  Not Estab. % Final   • Immature Grans Absolute 10/07/2020 0.0  0.0 - 0.1 x10E3/uL Final   • Total Cholesterol 10/07/2020 113  100 - 199 mg/dL Final   • Triglycerides 10/07/2020 74  0 - 149 mg/dL Final   • HDL Cholesterol 10/07/2020 42  >39 mg/dL Final   • VLDL Cholesterol Mike 10/07/2020 15  5 - 40 mg/dL Final   • LDL Chol Calc ([email or web address] 10/07/2020 56  0 - 99 mg/dL Final         Assessment/Plan   Diagnoses and all orders for this visit:    1. Coronary artery disease involving native  "coronary artery of native heart without angina pectoris (Primary)  -     simvastatin (Zocor) 40 MG tablet; Take 1 tablet by mouth Every Night.  Dispense: 90 tablet; Refill: 3  -     Comprehensive Metabolic Panel; Future    2. S/P CABG (coronary artery bypass graft)    3. Mixed hyperlipidemia  -     simvastatin (Zocor) 40 MG tablet; Take 1 tablet by mouth Every Night.  Dispense: 90 tablet; Refill: 3  -     ezetimibe (Zetia) 10 MG tablet; Take 1 tablet by mouth Daily.  Dispense: 90 tablet; Refill: 3  -     Lipid Panel; Future    We have a long discussion regarding the 2 topics of cholesterol management, statin use, ASCVD risk reduction and use of antiplatelet drugs post coronary artery bypass syndrome.  I explained to him the recommendations would be to discontinue the Plavix and continue the aspirin.  He prefers to continue the Plavix and discontinue the aspirin because the Plavix is \"stronger\".    I explained the lifetime ASCVD risk reduction with rosuvastatin and atorvastatin.  He has chosen the simvastatin and ezetimibe combination as above.  I will remove aspirin from his list.  I explained that even in the future there may be a recommendation to discontinue that depending upon his bleeding risk.  I sent him prescriptions for simvastatin and ezetimibe.  I would like to get his lipid panel checked in 3 months.  [facility].  If LDL goal is not appropriate, we can discuss changing the medication or increasing the simvastatin.             Return for WILL NEED LABS IN 3 MONTHS.    Call with any problems or concerns before next visit  "

## 2021-03-29 ENCOUNTER — TELEPHONE (OUTPATIENT)
Dept: CARDIOLOGY | Facility: CLINIC | Age: 56
End: 2021-03-29

## 2021-04-12 DIAGNOSIS — I10 ESSENTIAL HYPERTENSION: ICD-10-CM

## 2021-04-13 RX ORDER — CARVEDILOL 25 MG/1
TABLET ORAL
Qty: 60 TABLET | Refills: 5 | Status: SHIPPED | OUTPATIENT
Start: 2021-04-13 | End: 2021-09-08

## 2021-05-26 ENCOUNTER — OFFICE VISIT (OUTPATIENT)
Dept: FAMILY MEDICINE CLINIC | Facility: CLINIC | Age: 56
End: 2021-05-26

## 2021-05-26 VITALS
HEIGHT: 72 IN | TEMPERATURE: 97.5 F | DIASTOLIC BLOOD PRESSURE: 76 MMHG | WEIGHT: 177 LBS | HEART RATE: 67 BPM | BODY MASS INDEX: 23.98 KG/M2 | OXYGEN SATURATION: 99 % | SYSTOLIC BLOOD PRESSURE: 136 MMHG

## 2021-05-26 DIAGNOSIS — E78.2 MIXED HYPERLIPIDEMIA: ICD-10-CM

## 2021-05-26 DIAGNOSIS — J02.9 PHARYNGITIS, UNSPECIFIED ETIOLOGY: Primary | ICD-10-CM

## 2021-05-26 PROCEDURE — 99213 OFFICE O/P EST LOW 20 MIN: CPT | Performed by: FAMILY MEDICINE

## 2021-05-26 RX ORDER — AZITHROMYCIN 250 MG/1
TABLET, FILM COATED ORAL
Qty: 6 TABLET | Refills: 0 | Status: SHIPPED | OUTPATIENT
Start: 2021-05-26 | End: 2021-08-31

## 2021-05-26 NOTE — PROGRESS NOTES
Subjective   Emmanuel Carpenter is a 56 y.o. male.   Chief Complaint   Patient presents with   • Cough   • Sore Throat       History of Present Illness   Patient presents today with a sore throat,cough x 3 days.  Patient is experiencing a little drainage.  Above reviewed and verified.  For the past 3 to 4 days he has had a slight sore throat, some postnasal drainage.  Occasional cough.  No fevers or chills.  He has had problems with enlarged tonsils in the past.  He did not go to work yesterday or today.    He had labs done through the virtual clinic he is seeing for his testosterone management.  I did not know about this.  Those of been sent here and were scanned into the chart.  The last these were done on April 23.  He wants to review his lipid panel.  Total cholesterol was 97, LDL was 39, HDL was 45, triglycerides were 56.  At our last visit we discussed his cholesterol in detail.  We started him on a combination of simvastatin and ezetimibe.  Numbers are even lower today.  He asks if he should increase the medicine to get his lipid numbers even lower.    Once again, he asks if I will take over his testosterone management.        Patient Active Problem List    Diagnosis Date Noted   • Recurrent acute suppurative otitis media of right ear without spontaneous rupture of tympanic membrane 05/13/2020   • Mixed hyperlipidemia 04/22/2020   • Syncope and collapse 12/13/2019   • S/P CABG (coronary artery bypass graft) 12/13/2019   • Essential hypertension 10/22/2019     Note Last Updated: 10/22/2019     Patient's log blood pressures been reviewed has periods of systolic blood pressures in the 140s and diastolics greater than 80s     • Gastroesophageal reflux disease 11/13/2018   • Coronary artery disease involving native coronary artery of native heart without angina pectoris 11/13/2018     Note Last Updated: 9/26/2019     S/p CABG     • Paroxysmal atrial fibrillation (CMS/[facility]) 11/13/2018     Note Last Updated: 9/23/2020      Post-op A-fib.  S/p ablation.  No sustained a-fib for years.     • Shortness of breath 08/29/2018   • Varicose veins of bilateral lower extremities with other complications 06/03/2016   • Anxiety 01/12/2015   • Erythrocytosis 01/12/2015   • Primary male hypogonadism 01/12/2015   • Hypokalemia 08/29/2013   • Other specified disorders of adrenal gland (CMS/HCC) 08/01/2013   • Male hypogonadism 07/19/2013           Past Surgical History:   Procedure Laterality Date   • APPENDECTOMY  1990   • CARDIAC ABLATION  2006   • CARDIAC CATHETERIZATION Left 10/2/18-[facility]    w/Arteriography/Angiography-Dr. Hamlin--100% Occulsion of RCA; Significant L Main Stenosis   • CHOLECYSTECTOMY  2004   • CORONARY ARTERY BYPASS GRAFT  10/03/2018    urgent X4   Dr Farris     Current Outpatient Medications on File Prior to Visit   Medication Sig   • carvedilol (COREG) 25 MG tablet TAKE 1 TABLET BY MOUTH TWICE A DAY   • clopidogrel (PLAVIX) 75 MG tablet TAKE 1 TABLET BY MOUTH DAILY   • Copper Gluconate (COPPER CAPS) 2 MG capsule Take  by mouth.   • ezetimibe (Zetia) 10 MG tablet Take 1 tablet by mouth Daily.   • famotidine (Pepcid) 40 MG tablet Take 1 tablet by mouth 2 (Two) Times a Day.   • simvastatin (Zocor) 40 MG tablet Take 1 tablet by mouth Every Night.   • Testosterone Cypionate (Depo-Testosterone) 200 MG/ML injection Inject  into the appropriate muscle as directed by prescriber Every 14 (Fourteen) Days. 0.2 mL IM or SQ every other day.      No current facility-administered medications on file prior to visit.     Allergies   Allergen Reactions   • Other Other (See Comments)     Perfumes      Social History     Socioeconomic History   • Marital status:      Spouse name: Fely   • Number of children: Not on file   • Years of education: Not on file   • Highest education level: Not on file   Tobacco Use   • Smoking status: Never Smoker   • Smokeless tobacco: Never Used   Substance and Sexual Activity   • Alcohol use: No   • Drug use:  "No   • Sexual activity: Defer     Family History   Problem Relation Age of Onset   • Heart defect Maternal Grandmother         Enlarged Heart   • Atrial fibrillation Maternal Grandmother    • Diabetes Maternal Grandmother    • Hypertension Mother    • Melanoma Father    • Diabetes Father    • Heart disease Maternal Uncle    • Diabetes Paternal Grandmother        Review of Systems    Objective   /76 (BP Location: Left arm, Patient Position: Sitting, Cuff Size: Adult)   Pulse 67   Temp 97.5 °F (36.4 °C) (Infrared)   Ht 182.9 cm (72.01\")   Wt 80.3 kg (177 lb)   SpO2 99%   BMI 24.00 kg/m²   Physical Exam  Constitutional:       Appearance: He is well-developed.      Comments: Wearing a face mask     HENT:      Head: Normocephalic and atraumatic.      Nose: No congestion.      Mouth/Throat:      Pharynx: Oropharyngeal exudate (slight) and posterior oropharyngeal erythema present.      Tonsils: No tonsillar exudate or tonsillar abscesses. 2+ on the right. 2+ on the left.   Eyes:      Conjunctiva/sclera: Conjunctivae normal.   Cardiovascular:      Rate and Rhythm: Normal rate.   Pulmonary:      Effort: Pulmonary effort is normal.      Breath sounds: Normal breath sounds. No decreased breath sounds.   Musculoskeletal:         General: Normal range of motion.      Cervical back: Normal range of motion.   Skin:     General: Skin is warm and dry.      Findings: No rash.   Neurological:      Mental Status: He is alert and oriented to person, place, and time.   Psychiatric:         Behavior: Behavior normal.         Assessment/Plan   Diagnoses and all orders for this visit:    1. Pharyngitis, unspecified etiology (Primary)  -     azithromycin (Zithromax Z-Alejandro) 250 MG tablet; Take 2 tablets by mouth on day 1, then 1 tablet daily on days 2-5  Dispense: 6 tablet; Refill: 0    2. Mixed hyperlipidemia    We will treat his pharyngitis with azithromycin.  Also recommend Mucinex twice a day to help with any expectoration and " drainage.  Tylenol for any sore throat or achiness.  Lipid panel is very good.  It was actually good back in October.  There is no benefit to increasing his cholesterol medicine any further and I would not recommend nor prescribe different doses.  As before, defer management of his hypogonadism to the current treating physician.  Follow-up as planned.  I had recommended that he get a lipid panel repeated 3 months after our last visit which would be June 16.  He had his labs drawn at this other doctor.  No need to repeat his lipid panel in June.             Call with any problems or concerns before next visit  Return if symptoms worsen or fail to improve.      Much of this report is an electronic transcription of spoken language to printed text using Dragon dictation software.  As such, the subtleties and finesse of spoken language may permit erroneous, or at times, nonsensical words or phrases to be inadvertently transcribed; thus changes may be made at a later date to rectify these errors.

## 2021-05-26 NOTE — PROGRESS NOTES
Date of Office Visit: 2021  Encounter Provider: Dr. Timo Hamlin  Place of Service: [facility]ARD[facility] [place]  Patient Name: Emmanuel Carpenter  :1965  Yue Adamson MD    Chief Complaint   Patient presents with   • Coronary Artery Disease     9 month follow up   • Atrial Fibrillation   • Shortness of Breath   • Hyperlipidemia   • Hypertension     History of Present Illness    I am pleased to see Mr. Carpenter in my office today as a follow-up.     As you know, patient is 56years old white gentleman whose past medical history is significant for hypertension, hyperlipidemia, CAD, CABG, who came today for follow-up.     In 2018, patient was referred to me for symptom of angina pectoris.  Patient underwent stress test which was abnormal.  Patient underwent cardiac catheterization which showed three-vessel coronary artery disease with significant left main stenosis.  Patient underwent CABG x4.  In 2019, patient underwent VANDANA and carotid Doppler they were unremarkable.    Since the previous visit, patient is overall doing fairly well.  Patient denies any symptom of chest pain or tightness or heaviness.  Patient does aerobic exercises and they are within desirable range.  Patient denies any chest pain or tightness or heaviness.  No orthopnea PND no syncope or presyncope.  No leg edema noted.    EKG showed sinus rhythm with PVCs.  Incomplete right bundle branch is noted.    At this stage, patient is doing fairly well from cardiovascular standpoint.  I would continue current treatment.  Patient is advised to increase aerobic activity.  I would proceed with stress test next year        Past Medical History:   Diagnosis Date   • Actinic keratosis    • Acute kidney injury (CMS/HCC) 10/03/2018    Consulted by Dr. Nunez   • Aortic regurgitation 2016   • Aortic stenosis 10/02/2018    L Main Noted on Cardiac Cath   • Atrial fibrillation (CMS/HCC)    • Bronchitis    • Cataract    • Coronary artery  disease    • Detached retina    • Family history of heart disease     Enlarged Hearts & Valve Replacements   • GERD (gastroesophageal reflux disease)    • History of chest x-ray 10/5/18-[facility]    Stable but Small L Apical Pneumothorax   • History of echocardiogram 03/23/16-[facility]    Mild Concentrive L Ventricular Hypertrophy; Moderate Aortic Reg; Mild Mitral Reg   • HLD (hyperlipidemia)    • Hx of chest x-ray 10/3/18-[facility]    Small L Apical Pneumothorax    • Hypertension    • Hypogonadism in male    • Hypokalemia    • Left ventricular hypertrophy by electrocardiogram     in the past by echo- mild.    • Mild concentric left ventricular hypertrophy 03/23/2016    Noted on Echo   • Pelvis tilted    • Pneumothorax 10/05/2018    Small Noted on Chest XR   • Polycythemia    • SVT (supraventricular tachycardia) (CMS/HCC) Hx    S/p Ablation in 2006         Past Surgical History:   Procedure Laterality Date   • APPENDECTOMY  1990   • CARDIAC ABLATION  2006   • CARDIAC CATHETERIZATION Left 10/2/18-[facility]    w/Arteriography/Angiography-Dr. Hamlin--100% Occulsion of RCA; Significant L Main Stenosis   • CHOLECYSTECTOMY  2004   • CORONARY ARTERY BYPASS GRAFT  10/03/2018    urgent X4   Dr Farris           Current Outpatient Medications:   •  azithromycin (Zithromax Z-Alejandro) 250 MG tablet, Take 2 tablets by mouth on day 1, then 1 tablet daily on days 2-5, Disp: 6 tablet, Rfl: 0  •  carvedilol (COREG) 25 MG tablet, TAKE 1 TABLET BY MOUTH TWICE A DAY, Disp: 60 tablet, Rfl: 5  •  clopidogrel (PLAVIX) 75 MG tablet, TAKE 1 TABLET BY MOUTH DAILY, Disp: 90 tablet, Rfl: 1  •  Copper Gluconate (COPPER CAPS) 2 MG capsule, Take  by mouth., Disp: , Rfl:   •  ezetimibe (Zetia) 10 MG tablet, Take 1 tablet by mouth Daily., Disp: 90 tablet, Rfl: 3  •  famotidine (Pepcid) 40 MG tablet, Take 1 tablet by mouth 2 (Two) Times a Day., Disp: 180 tablet, Rfl: 3  •  simvastatin (Zocor) 40 MG tablet, Take 1 tablet by mouth Every Night., Disp: 90 tablet, Rfl: 3  •   "Testosterone Cypionate (Depo-Testosterone) 200 MG/ML injection, Inject  into the appropriate muscle as directed by prescriber Every 14 (Fourteen) Days. 0.2 mL IM or SQ every other day. , Disp: , Rfl:       Social History     Socioeconomic History   • Marital status:      Spouse name: Fely   • Number of children: Not on file   • Years of education: Not on file   • Highest education level: Not on file   Tobacco Use   • Smoking status: Never Smoker   • Smokeless tobacco: Never Used   Vaping Use   • Vaping Use: Never used   Substance and Sexual Activity   • Alcohol use: No   • Drug use: No   • Sexual activity: Defer         Review of Systems   Constitutional: Negative for chills and fever.   HENT: Negative for ear discharge and nosebleeds.    Eyes: Negative for discharge and redness.   Cardiovascular: Negative for chest pain, orthopnea, palpitations, paroxysmal nocturnal dyspnea and syncope.   Respiratory: Negative for cough, shortness of breath and wheezing.    Endocrine: Negative for heat intolerance.   Skin: Negative for rash.   Musculoskeletal: Negative for arthritis and myalgias.   Gastrointestinal: Negative for abdominal pain, melena, nausea and vomiting.   Genitourinary: Negative for dysuria and hematuria.   Neurological: Negative for dizziness, light-headedness, numbness and tremors.   Psychiatric/Behavioral: Negative for depression. The patient is not nervous/anxious.        Procedures      ECG 12 Lead    Date/Time: 5/27/2021 1:50 PM  Performed by: Timo Hamlin MD  Authorized by: Timo Hamlin MD   Comparison: compared with previous ECG   Similar to previous ECG  Rhythm: sinus rhythm    Clinical impression: normal ECG            ECG 12 Lead    (Results Pending)           Objective:    /74   Pulse 80   Ht 182.9 cm (72.01\")   Wt 80.7 kg (178 lb)   BMI 24.14 kg/m²         Constitutional:       Appearance: Well-developed.   Eyes:      General: No scleral icterus.        Right eye: No discharge. "   HENT:      Head: Normocephalic and atraumatic.   Neck:      Thyroid: No thyromegaly.      Lymphadenopathy: No cervical adenopathy.   Pulmonary:      Effort: Pulmonary effort is normal. No respiratory distress.      Breath sounds: Normal breath sounds. No wheezing. No rales.   Cardiovascular:      Normal rate. Regular rhythm.      No gallop.   Abdominal:      Tenderness: There is no abdominal tenderness.   Skin:     Findings: No erythema or rash.   Neurological:      Mental Status: Alert and oriented to person, place, and time.             Assessment:       Diagnosis Plan   1. Coronary artery disease involving native coronary artery of native heart without angina pectoris  ECG 12 Lead   2. Paroxysmal atrial fibrillation (CMS/HCC)  ECG 12 Lead   3. Shortness of breath  ECG 12 Lead   4. Essential hypertension  ECG 12 Lead   5. Syncope and collapse  ECG 12 Lead   6. Mixed hyperlipidemia  ECG 12 Lead            Plan:       MDM:    1.  CAD:    Patient does not have any symptom of angina pectoris or congestive heart failure.  Consider stress test next year.    2.  Hypertension:    Blood pressure is well controlled.  Continue current treatment.    3.  Syncope:    Patient has not had any further episode of chest pain    4.  Shortness of breath:    Shortness of breath has resolved.  Patient is doing well.    5.  Hyperlipidemia:    Patient is on simvastatin.  Latest blood work is reviewed by me and his LDL is 56.  It is desirable.

## 2021-05-27 ENCOUNTER — TELEPHONE (OUTPATIENT)
Dept: FAMILY MEDICINE CLINIC | Facility: CLINIC | Age: 56
End: 2021-05-27

## 2021-05-27 ENCOUNTER — OFFICE VISIT (OUTPATIENT)
Dept: CARDIOLOGY | Facility: CLINIC | Age: 56
End: 2021-05-27

## 2021-05-27 VITALS
DIASTOLIC BLOOD PRESSURE: 74 MMHG | BODY MASS INDEX: 24.11 KG/M2 | SYSTOLIC BLOOD PRESSURE: 136 MMHG | HEART RATE: 80 BPM | HEIGHT: 72 IN | WEIGHT: 178 LBS

## 2021-05-27 DIAGNOSIS — I25.10 CORONARY ARTERY DISEASE INVOLVING NATIVE CORONARY ARTERY OF NATIVE HEART WITHOUT ANGINA PECTORIS: Primary | ICD-10-CM

## 2021-05-27 DIAGNOSIS — I48.0 PAROXYSMAL ATRIAL FIBRILLATION (HCC): ICD-10-CM

## 2021-05-27 DIAGNOSIS — R55 SYNCOPE AND COLLAPSE: ICD-10-CM

## 2021-05-27 DIAGNOSIS — I10 ESSENTIAL HYPERTENSION: ICD-10-CM

## 2021-05-27 DIAGNOSIS — E78.2 MIXED HYPERLIPIDEMIA: ICD-10-CM

## 2021-05-27 DIAGNOSIS — R06.02 SHORTNESS OF BREATH: ICD-10-CM

## 2021-05-27 PROCEDURE — 99214 OFFICE O/P EST MOD 30 MIN: CPT | Performed by: INTERNAL MEDICINE

## 2021-05-27 PROCEDURE — 93000 ELECTROCARDIOGRAM COMPLETE: CPT | Performed by: INTERNAL MEDICINE

## 2021-05-27 RX ORDER — SULFAMETHOXAZOLE AND TRIMETHOPRIM 800; 160 MG/1; MG/1
1 TABLET ORAL 2 TIMES DAILY
Qty: 14 TABLET | Refills: 0 | Status: SHIPPED | OUTPATIENT
Start: 2021-05-27 | End: 2021-05-27

## 2021-05-27 NOTE — TELEPHONE ENCOUNTER
Please tell Emmanuel that the symptoms of pressure in his ears and drainage are all consistent with his upper respiratory infection.    Never had anyone experience rapid heartbeat after a azithromycin before.  I would recommend he not take that again.    Continue the Mucinex and the [place] pot.    He actually saw Dr. Gary who is an ENT specialist in January of this year.  He does not need a new referral to be reevaluated there, he just has to call and tell them what is going on and request an appointment.    I am going to send a different antibiotic called Bactrim to the pharmacy.  If he is not able to get into ENT soon, then I would recommend he get the Bactrim and try it and see if it is better tolerated.    Thank you

## 2021-05-27 NOTE — TELEPHONE ENCOUNTER
Caller: Emmanuel Carpenter    Relationship to patient: Self    Best call back number: [phone]    Patient is needing: PATIENT CALLED IN AND SAID HE TOOK THE PRESCRIBED MEDICATION. AFTER 2 HOURS HE FELT IT IN HIS HEAD AND HE HAS HAD A LOT OF DRAINAGE. HE SAID HIS HEART FELT LIKE IT WAS BEATING QUICKLY (PATIENT IS ON BETA BLOCKER), AND THIS MORNING HE HAD TO USE A NATTY POT TO CLEAN OUT THE DRAINAGE. HE ALSO HAS PRESSURE IN HIS EARS. HE SAID HE DID NOT HAVE THESE SYMPTOMS YESTERDAY AND HE IS NOT SURE IF THIS IS NORMAL OR NOT. PATIENT WAS ALSO TOLD ABOUT BEING REFERRED TO A SPECIALIST FOR HIS TONSILS. PLEASE CALL PATIENT AND ADVISE.

## 2021-06-11 RX ORDER — CLOPIDOGREL BISULFATE 75 MG/1
75 TABLET ORAL DAILY
Qty: 90 TABLET | Refills: 1 | Status: SHIPPED | OUTPATIENT
Start: 2021-06-11 | End: 2021-12-03

## 2021-08-31 ENCOUNTER — OFFICE VISIT (OUTPATIENT)
Dept: CARDIOLOGY | Facility: CLINIC | Age: 56
End: 2021-08-31

## 2021-08-31 VITALS
BODY MASS INDEX: 24.65 KG/M2 | WEIGHT: 182 LBS | OXYGEN SATURATION: 97 % | HEART RATE: 60 BPM | HEIGHT: 72 IN | SYSTOLIC BLOOD PRESSURE: 140 MMHG | DIASTOLIC BLOOD PRESSURE: 88 MMHG

## 2021-08-31 DIAGNOSIS — E78.2 MIXED HYPERLIPIDEMIA: ICD-10-CM

## 2021-08-31 DIAGNOSIS — R53.83 OTHER FATIGUE: ICD-10-CM

## 2021-08-31 DIAGNOSIS — I25.10 CORONARY ARTERY DISEASE INVOLVING NATIVE CORONARY ARTERY OF NATIVE HEART WITHOUT ANGINA PECTORIS: Primary | ICD-10-CM

## 2021-08-31 DIAGNOSIS — I48.0 PAROXYSMAL ATRIAL FIBRILLATION (HCC): ICD-10-CM

## 2021-08-31 DIAGNOSIS — I10 ESSENTIAL HYPERTENSION: ICD-10-CM

## 2021-08-31 PROCEDURE — 99214 OFFICE O/P EST MOD 30 MIN: CPT | Performed by: NURSE PRACTITIONER

## 2021-08-31 PROCEDURE — 93000 ELECTROCARDIOGRAM COMPLETE: CPT | Performed by: NURSE PRACTITIONER

## 2021-09-01 PROBLEM — R53.83 OTHER FATIGUE: Status: ACTIVE | Noted: 2021-09-01

## 2021-09-01 NOTE — PROGRESS NOTES
Cardiology Office Follow Up Visit      Primary Care Provider:  Yue Adamson MD    Reason for f/u:     History of coronary artery disease  Hypertension  Previous CABG  Dyslipidemia      Subjective     CC:    Denies chest pain or dyspnea    History of Present Illness       Emmanuel Carpenter is a 56 y.o. male.  Emmanuel is here today for earlier than scheduled follow-up due to concern for recent complaints of fatigue that he thinks may be related to his carvedilol.    The patient has a past medical history of coronary artery disease.  In August 2018 he underwent cardiac catheterization which showed three-vessel coronary artery disease and significant left main stenosis.  He underwent four-vessel bypass surgery.  Postoperatively he did have paroxysmal atrial fibrillation.  He previously was on amiodarone which has been discontinued and the patient has been maintaining sinus rhythm.    In 2019 the patient underwent a carotid Doppler and VANDANA studies that were unremarkable.    The patient actively participates in cardiac rehab and brings a list of his blood pressures from home for review.    He denies any current or recent chest pain, dyspnea, PND, orthopnea, palpitations, near syncope, lower extremity edema or feelings of his heart racing.  He is compliant with medical therapy.  He is experiencing fatigue that he wonders if it is being exacerbated by his beta-blocker.    Patient brought copies of his labs from his PCP for review.  His hemoglobin is stable.  BUN and creatinine are within normal limits LDL cholesterol is 34 HDL 46 total cholesterol 91.  TSH is 2.33        Past Medical History:   Diagnosis Date   • Actinic keratosis    • Acute kidney injury (CMS/HCC) 10/03/2018    Consulted by Dr. Nunez   • Aortic regurgitation 2016   • Aortic stenosis 10/02/2018    [facility] Noted on Cardiac Cath   • Atrial fibrillation (CMS/HCC)    • Bronchitis    • Cataract    • Coronary artery disease    • Detached retina    •  Family history of heart disease     Enlarged Hearts & Valve Replacements   • GERD (gastroesophageal reflux disease)    • History of chest x-ray 10/5/18-[facility]    Stable but Small L Apical Pneumothorax   • History of echocardiogram 03/23/16-[facility]    Mild Concentrive L Ventricular Hypertrophy; Moderate Aortic Reg; Mild Mitral Reg   • HLD (hyperlipidemia)    • Hx of chest x-ray 10/3/18-[facility]    Small L Apical Pneumothorax    • Hypertension    • Hypogonadism in male    • Hypokalemia    • Left ventricular hypertrophy by electrocardiogram     in the past by echo- mild.    • Mild concentric left ventricular hypertrophy 03/23/2016    Noted on Echo   • Pelvis tilted    • Pneumothorax 10/05/2018    Small Noted on Chest XR   • Polycythemia    • SVT (supraventricular tachycardia) (CMS/HCC) Hx    S/p Ablation in 2006       Past Surgical History:   Procedure Laterality Date   • APPENDECTOMY  1990   • CARDIAC ABLATION  2006   • CARDIAC CATHETERIZATION Left 10/2/18-[facility]    w/Arteriography/Angiography-Dr. Hamlin--100% Occulsion of RCA; Significant L Main Stenosis   • CHOLECYSTECTOMY  2004   • CORONARY ARTERY BYPASS GRAFT  10/03/2018    urgent X4   Dr Farris         Current Outpatient Medications:   •  carvedilol (COREG) 25 MG tablet, TAKE 1 TABLET BY MOUTH TWICE A DAY, Disp: 60 tablet, Rfl: 5  •  clopidogrel (PLAVIX) 75 MG tablet, TAKE 1 TABLET BY MOUTH DAILY, Disp: 90 tablet, Rfl: 1  •  ezetimibe (Zetia) 10 MG tablet, Take 1 tablet by mouth Daily., Disp: 90 tablet, Rfl: 3  •  famotidine (Pepcid) 40 MG tablet, Take 1 tablet by mouth 2 (Two) Times a Day., Disp: 180 tablet, Rfl: 3  •  simvastatin (Zocor) 40 MG tablet, Take 1 tablet by mouth Every Night., Disp: 90 tablet, Rfl: 3  •  Testosterone Cypionate (Depo-Testosterone) 200 MG/ML injection, Inject  into the appropriate muscle as directed by prescriber Every 14 (Fourteen) Days. 0.2 mL IM or SQ every other day. , Disp: , Rfl:     Social History     Socioeconomic History   • Marital status:       Spouse name: Fely   • Number of children: Not on file   • Years of education: Not on file   • Highest education level: Not on file   Tobacco Use   • Smoking status: Never Smoker   • Smokeless tobacco: Never Used   Vaping Use   • Vaping Use: Never used   Substance and Sexual Activity   • Alcohol use: No   • Drug use: No   • Sexual activity: Defer       Family History   Problem Relation Age of Onset   • Heart defect Maternal Grandmother         Enlarged Heart   • Atrial fibrillation Maternal Grandmother    • Diabetes Maternal Grandmother    • Hypertension Mother    • Melanoma Father    • Diabetes Father    • Heart disease Maternal Uncle    • Diabetes Paternal Grandmother        The following portions of the patient's history were reviewed and updated as appropriate: allergies, current medications, past family history, past medical history, past social history, past surgical history and problem list.    Review of Systems   Constitutional: Positive for malaise/fatigue. Negative for decreased appetite and diaphoresis.   HENT: Negative for congestion, hearing loss and nosebleeds.    Cardiovascular: Negative for chest pain, claudication, dyspnea on exertion, irregular heartbeat, leg swelling, near-syncope, orthopnea, palpitations, paroxysmal nocturnal dyspnea and syncope.   Respiratory: Negative for cough, shortness of breath and sleep disturbances due to breathing.    Endocrine: Negative for polyuria.   Hematologic/Lymphatic: Does not bruise/bleed easily.   Skin: Negative for itching and rash.   Musculoskeletal: Negative for back pain, muscle weakness and myalgias.   Gastrointestinal: Negative for abdominal pain, change in bowel habit and nausea.   Genitourinary: Negative for dysuria, flank pain, frequency and hesitancy.   Neurological: Negative for dizziness, tremors and weakness.   Psychiatric/Behavioral: Negative for altered mental status. The patient does not have insomnia.      /88   Pulse 60    "Ht 182.9 cm (72\")   Wt 82.6 kg (182 lb)   SpO2 97%   BMI 24.68 kg/m² .  Objective     Vitals reviewed.   Constitutional:       General: Not in acute distress.     Appearance: Normal and healthy appearance. Well-developed and not in distress.   Eyes:      Pupils: Pupils are equal, round, and reactive to light.   HENT:      Head: Normocephalic and atraumatic.   Neck:      Vascular: No JVD.   Pulmonary:      Effort: Pulmonary effort is normal.      Breath sounds: Normal breath sounds.   Cardiovascular:      Normal rate. Regular rhythm.   Pulses:     Intact distal pulses.   Edema:     Peripheral edema absent.   Abdominal:      General: There is no distension.      Palpations: Abdomen is soft.      Tenderness: There is no abdominal tenderness.   Musculoskeletal: Normal range of motion.      Cervical back: Normal range of motion and neck supple. Skin:     General: Skin is warm and dry.   Neurological:      Mental Status: Alert, oriented to person, place, and time and oriented to person, place and time.             ECG 12 Lead    Date/Time: 8/31/2021 3:22 PM  Performed by: Amy Bill APRN  Authorized by: Amy Bill APRN   Rhythm: sinus rhythm  BPM: 60  Conduction: incomplete right bundle branch block    Clinical impression: non-specific ECG            EKG ordered by and reviewed by me in office         Diagnoses and all orders for this visit:    1. Coronary artery disease involving native coronary artery of native heart without angina pectoris (Primary)  Comments:  No recent signs or symptoms to suggest unstable angina    2. Essential hypertension  Comments:  Blood pressure is currently stable on medical therapy though he is having some fatigue which may be related to carvedilol    3. Mixed hyperlipidemia  Comments:  Remains on statin therapy with Zetia    4. Paroxysmal atrial fibrillation (CMS/HCC)  Comments:  Currently maintaining sinus rhythm    5. Other fatigue    Other orders  -     Cancel: ECG 12 " Lead  -     ECG 12 Lead           MEDICAL DECISION MAKING:        Patient is here for follow-up and to discuss recent complaints of fatigue that he is attributing to beta-blockers.    He has had recent labs that are unremarkable.    He is not having any signs or symptoms of acute cardiac decompensation.    On his current medical therapy of carvedilol 25 mg twice daily his blood pressure is well controlled.    We have discussed several options for maintenance.  1 option was to discontinue carvedilol and trial Bystolic.  Another option was reducing his carvedilol monitoring his blood pressure for.  Of 1 to 2 weeks and adding a second agent such as losartan if his blood pressure starts trending upwards.    At this time the patient reports he does feel like he has positive benefits from carvedilol and would potentially like to stay on that medication.  We will reduce it to 12.5 mg twice daily.    He is going to cardiac rehab for this week so we will check his blood pressure there and report to our office.  If he shows trending upwards blood pressure we have discussed adding low-dose losartan which the patient has previously been on and had no unpleasant side effects from.    The patient will keep his scheduled follow-up with Dr. Hamlin.  He is going to contact her office on Friday with an update of how his blood pressures have been doing in cardiac rehab.

## 2021-09-03 ENCOUNTER — TELEPHONE (OUTPATIENT)
Dept: CARDIOLOGY | Facility: CLINIC | Age: 56
End: 2021-09-03

## 2021-09-03 NOTE — TELEPHONE ENCOUNTER
He was in to see you Tuesday  His carvedilol was decreased from 50 mg to 25 mg Blood pressure readings Tuesday evening  140/85 Wednesday morning 131/84 before exercising after 114/70 this morning before exercise  128/77 after 108/68  He has already noticed an improvement in his symptoms

## 2021-09-08 ENCOUNTER — TELEPHONE (OUTPATIENT)
Dept: CARDIOLOGY | Facility: CLINIC | Age: 56
End: 2021-09-08

## 2021-09-08 RX ORDER — NEBIVOLOL 10 MG/1
10 TABLET ORAL DAILY
Qty: 28 TABLET | Refills: 0 | COMMUNITY
Start: 2021-09-08 | End: 2021-11-02

## 2021-09-08 NOTE — TELEPHONE ENCOUNTER
He was in week ago You cut his carvedilol in half  The carvedilol was working with some of his symptoms He would like to try bystolic  His blood pressure is running higher He would like a call to go over his medication and blood pressure

## 2021-09-08 NOTE — PROGRESS NOTES
Recently reduced carvedilol due to fatigue  Reports significant improvement in symptoms with reduction but Bp trending up    Discussed options with pt.     Adding low dose losartan to lower dose carvedilol vs trialing bystolic Rx     Pt wishes to trial bystolic    Will start 10 mg po daily and stop carvedilol

## 2021-09-16 ENCOUNTER — OFFICE VISIT (OUTPATIENT)
Dept: CARDIOLOGY | Facility: CLINIC | Age: 56
End: 2021-09-16

## 2021-09-16 VITALS
BODY MASS INDEX: 24.52 KG/M2 | WEIGHT: 181 LBS | HEART RATE: 63 BPM | OXYGEN SATURATION: 98 % | DIASTOLIC BLOOD PRESSURE: 76 MMHG | SYSTOLIC BLOOD PRESSURE: 130 MMHG | HEIGHT: 72 IN

## 2021-09-16 DIAGNOSIS — I25.10 CORONARY ARTERY DISEASE INVOLVING NATIVE CORONARY ARTERY OF NATIVE HEART WITHOUT ANGINA PECTORIS: Primary | ICD-10-CM

## 2021-09-16 DIAGNOSIS — I10 ESSENTIAL HYPERTENSION: ICD-10-CM

## 2021-09-16 PROBLEM — N50.9 DISORDER OF TESTIS: Status: ACTIVE | Noted: 2021-09-16

## 2021-09-16 PROBLEM — E29.1 TESTICULAR HYPOFUNCTION: Status: ACTIVE | Noted: 2021-09-16

## 2021-09-16 PROBLEM — L30.9 ECZEMA: Status: ACTIVE | Noted: 2021-09-16

## 2021-09-16 PROBLEM — R13.10 DYSPHAGIA: Status: ACTIVE | Noted: 2018-08-30

## 2021-09-16 PROCEDURE — 99213 OFFICE O/P EST LOW 20 MIN: CPT | Performed by: NURSE PRACTITIONER

## 2021-09-16 PROCEDURE — 93000 ELECTROCARDIOGRAM COMPLETE: CPT | Performed by: NURSE PRACTITIONER

## 2021-09-16 RX ORDER — PANTOPRAZOLE SODIUM 40 MG/1
TABLET, DELAYED RELEASE ORAL
COMMUNITY
End: 2021-09-16

## 2021-09-16 RX ORDER — POTASSIUM CHLORIDE 750 MG/1
TABLET, EXTENDED RELEASE ORAL
COMMUNITY
End: 2021-09-16

## 2021-09-16 RX ORDER — CHOLECALCIFEROL (VITAMIN D3) 125 MCG
1 TABLET ORAL DAILY
COMMUNITY
End: 2021-12-20

## 2021-09-16 RX ORDER — CARVEDILOL 25 MG/1
25 TABLET ORAL 2 TIMES DAILY
COMMUNITY
Start: 2021-09-10 | End: 2021-09-16

## 2021-09-16 RX ORDER — MULTIPLE VITAMINS W/ MINERALS TAB 9MG-400MCG
TAB ORAL
COMMUNITY
End: 2021-09-16

## 2021-09-16 NOTE — PROGRESS NOTES
Cardiology Office Follow Up Visit      Primary Care Provider:  Yue Adamson MD    Reason for f/u:     Fatigue  Hypertension  Coronary artery disease with previous bypass      Subjective     CC:    Reports improvement in fatigue and overall feeling of wellness since changing Coreg to Bystolic    History of Present Illness       Emmanuel Carpenter is a 56 y.o. male. The patient has a past medical history of coronary artery disease.  In August 2018 he underwent cardiac catheterization which showed three-vessel coronary artery disease and significant left main stenosis.  He underwent four-vessel bypass surgery.  Postoperatively he did have paroxysmal atrial fibrillation.  He previously was on amiodarone which has been discontinued and the patient has been maintaining sinus rhythm.     In 2019 the patient underwent a carotid Doppler and VANDANA studies that were unremarkable.     The patient actively participates in cardiac rehab and brings a list of his blood pressures from home for review.     He denies any current or recent chest pain, dyspnea, PND, orthopnea, palpitations, near syncope, lower extremity edema or feelings of his heart racing.  He is compliant with medical therapy.      We made a recent switch in his antihypertensive agent from carvedilol to Bystolic due to his complaints of fatigue and he reports that he believes that has improved since that time    He brought a list of his blood pressures for review from cardiac rehab.  And he has a mild elevation of his blood pressure while exercising but otherwise they have been stable his heart rate has been stable    Patient recently brought copies of his labs from his PCP for review.  His hemoglobin is stable.  BUN and creatinine are within normal limits LDL cholesterol is 34 HDL 46 total cholesterol 91.  TSH is 2.33        Past Medical History:   Diagnosis Date   • Actinic keratosis    • Acute kidney injury (CMS/HCC) 10/03/2018    Consulted by Dr. Nunez   •  Aortic regurgitation 2016   • Aortic stenosis 10/02/2018    [facility] Noted on Cardiac Cath   • Atrial fibrillation (CMS/HCC)    • Bronchitis    • Cataract    • Coronary artery disease    • Detached retina    • Family history of heart disease     Enlarged Hearts & Valve Replacements   • GERD (gastroesophageal reflux disease)    • History of chest x-ray 10/5/18-[facility]    Stable but Small L Apical Pneumothorax   • History of echocardiogram 03/23/16-F    Mild Concentrive L Ventricular Hypertrophy; Moderate Aortic Reg; Mild Mitral Reg   • HLD (hyperlipidemia)    • Hx of chest x-ray 10/3/18-F    Small L Apical Pneumothorax    • Hypertension    • Hypogonadism in male    • Hypokalemia    • Left ventricular hypertrophy by electrocardiogram     in the past by echo- mild.    • Mild concentric left ventricular hypertrophy 03/23/2016    Noted on Echo   • Pelvis tilted    • Pneumothorax 10/05/2018    Small Noted on Chest XR   • Polycythemia    • SVT (supraventricular tachycardia) (CMS/HCC) Hx    S/p Ablation in 2006       Past Surgical History:   Procedure Laterality Date   • APPENDECTOMY  1990   • CARDIAC ABLATION  2006   • CARDIAC CATHETERIZATION Left 10/2/18-F    w/Arteriography/Angiography-Dr. Hamlin--100% Occulsion of RCA; Significant L Main Stenosis   • CHOLECYSTECTOMY  2004   • CORONARY ARTERY BYPASS GRAFT  10/03/2018    urgent X4   Dr Farris         Current Outpatient Medications:   •  clopidogrel (PLAVIX) 75 MG tablet, TAKE 1 TABLET BY MOUTH DAILY, Disp: 90 tablet, Rfl: 1  •  Ergocalciferol (VITAMIN D2 PO), Vitamin D2, Disp: , Rfl:   •  ezetimibe (Zetia) 10 MG tablet, Take 1 tablet by mouth Daily., Disp: 90 tablet, Rfl: 3  •  famotidine (Pepcid) 40 MG tablet, Take 1 tablet by mouth 2 (Two) Times a Day., Disp: 180 tablet, Rfl: 3  •  nebivolol (Bystolic) 10 MG tablet, Take 1 tablet by mouth Daily., Disp: 28 tablet, Rfl: 0  •  simvastatin (Zocor) 40 MG tablet, Take 1 tablet by mouth Every Night., Disp: 90 tablet, Rfl: 3  •   Testosterone Cypionate (Depo-Testosterone) 200 MG/ML injection, Inject  into the appropriate muscle as directed by prescriber Every 14 (Fourteen) Days. 0.2 mL IM or SQ every other day. , Disp: , Rfl:     Social History     Socioeconomic History   • Marital status:      Spouse name: Fely   • Number of children: Not on file   • Years of education: Not on file   • Highest education level: Not on file   Tobacco Use   • Smoking status: Never Smoker   • Smokeless tobacco: Never Used   Vaping Use   • Vaping Use: Never used   Substance and Sexual Activity   • Alcohol use: No   • Drug use: No   • Sexual activity: Defer       Family History   Problem Relation Age of Onset   • Heart defect Maternal Grandmother         Enlarged Heart   • Atrial fibrillation Maternal Grandmother    • Diabetes Maternal Grandmother    • Hypertension Mother    • Melanoma Father    • Diabetes Father    • Heart disease Maternal Uncle    • Diabetes Paternal Grandmother        The following portions of the patient's history were reviewed and updated as appropriate: allergies, current medications, past family history, past medical history, past social history, past surgical history and problem list.    Review of Systems   Constitutional: Negative for decreased appetite and diaphoresis.   HENT: Negative for congestion, hearing loss and nosebleeds.    Cardiovascular: Negative for chest pain, claudication, dyspnea on exertion, irregular heartbeat, leg swelling, near-syncope, orthopnea, palpitations, paroxysmal nocturnal dyspnea and syncope.   Respiratory: Negative for cough, shortness of breath and sleep disturbances due to breathing.    Endocrine: Negative for polyuria.   Hematologic/Lymphatic: Does not bruise/bleed easily.   Skin: Negative for itching and rash.   Musculoskeletal: Negative for back pain, muscle weakness and myalgias.   Gastrointestinal: Negative for abdominal pain, change in bowel habit and nausea.   Genitourinary: Negative for  "dysuria, flank pain, frequency and hesitancy.   Neurological: Negative for dizziness, tremors and weakness.   Psychiatric/Behavioral: Negative for altered mental status. The patient does not have insomnia.      /76   Pulse 63   Ht 182.9 cm (72.01\")   Wt 82.1 kg (181 lb)   SpO2 98%   BMI 24.54 kg/m² .  Objective     Vitals reviewed.   Constitutional:       General: Not in acute distress.     Appearance: Normal appearance. Well-developed.   Eyes:      Pupils: Pupils are equal, round, and reactive to light.   HENT:      Head: Normocephalic and atraumatic.   Neck:      Vascular: No JVD.   Pulmonary:      Effort: Pulmonary effort is normal.      Breath sounds: Normal breath sounds.   Cardiovascular:      Normal rate. Regular rhythm.   Pulses:     Intact distal pulses.   Edema:     Peripheral edema absent.   Abdominal:      General: There is no distension.      Palpations: Abdomen is soft.      Tenderness: There is no abdominal tenderness.   Musculoskeletal: Normal range of motion.      Cervical back: Normal range of motion and neck supple. Skin:     General: Skin is warm and dry.   Neurological:      Mental Status: Alert and oriented to person, place, and time.             ECG 12 Lead    Date/Time: 9/16/2021 2:25 PM  Performed by: Amy Bill APRN  Authorized by: Amy Bill APRN   Comparison: not compared with previous ECG   Rhythm: sinus rhythm  BPM: 63  Conduction: incomplete right bundle branch block    Clinical impression: non-specific ECG            EKG ordered by and reviewed by me in office         Diagnoses and all orders for this visit:    1. Coronary artery disease involving native coronary artery of native heart without angina pectoris (Primary)  Comments:  Previous bypass with no current signs or symptoms of angina    2. Essential hypertension  Comments:  Here to follow-up on recent medication changes due to fatigue induced by beta-blockers.           MEDICAL DECISION MAKING:       "     Patient had recent change in his medicines and presents here for follow-up.    We stopped his carvedilol and replaced it with Bystolic 10 mg daily.  This change was prompted by his complaints of fatigue.    He does report an increase in his energy levels and decrease in erectile dysfunction.  Overall he reports he feels better on Bystolic.    His blood pressure is not ideally controlled during exercise but otherwise appears stable.  He would like to try increasing the dose of Bystolic.    At this time we have increased his dose to 20 mg daily.  He will continue to log his blood pressure and heart rate for me and report them to our office next week.    Otherwise he will keep his scheduled follow-up with Dr. Hamlin.

## 2021-09-27 ENCOUNTER — OFFICE VISIT (OUTPATIENT)
Dept: FAMILY MEDICINE CLINIC | Facility: CLINIC | Age: 56
End: 2021-09-27

## 2021-09-27 VITALS
SYSTOLIC BLOOD PRESSURE: 132 MMHG | WEIGHT: 178.2 LBS | HEIGHT: 72 IN | HEART RATE: 75 BPM | OXYGEN SATURATION: 97 % | DIASTOLIC BLOOD PRESSURE: 72 MMHG | BODY MASS INDEX: 24.14 KG/M2 | TEMPERATURE: 99.2 F | RESPIRATION RATE: 16 BRPM

## 2021-09-27 DIAGNOSIS — R68.83 CHILLS: ICD-10-CM

## 2021-09-27 DIAGNOSIS — R50.9 LOW GRADE FEVER: Primary | ICD-10-CM

## 2021-09-27 DIAGNOSIS — R05.9 COUGH: ICD-10-CM

## 2021-09-27 PROCEDURE — 99213 OFFICE O/P EST LOW 20 MIN: CPT | Performed by: NURSE PRACTITIONER

## 2021-09-27 RX ORDER — CARVEDILOL 25 MG/1
25 TABLET ORAL 2 TIMES DAILY WITH MEALS
COMMUNITY
End: 2021-10-11

## 2021-09-27 NOTE — ASSESSMENT & PLAN NOTE
Discussed taking Tylenol as needed fever or discomfort.  Patient encouraged to drink plenty of fluids.  Instructed to stay off work until Covid and flu test are back from other providers office.

## 2021-09-27 NOTE — ASSESSMENT & PLAN NOTE
Recommended rest, fluids, and Tylenol for relief.  Will await results from other providers office for flu and Covid.

## 2021-09-27 NOTE — PROGRESS NOTES
"Chief Complaint  Fever (since friday ) and Generalized Body Aches    Subjective          Emmanuel Carpenter presents to [facility]     History of Present Illness     Friday pt reports he was very tired and weak. Fevers started Saturday at 100-102 and over the last few days. He took Tylenol with relief however, he still has a low grade temp of .  Reports he has a slight dry cough that is intermittent.  He denies shortness of breath.  He denies smell or taste alterations.  He reports his appetite is off but he is drinking adequate fluids.  He does report he has had some aches and chills as well mostly in his lower extremities.    Had a Covid Swab and a flu swab this morning at Dr. English office ([place]) and will have results in 48 hours.       Objective   Vital Signs:   /72 (BP Location: Left arm, Patient Position: Sitting, Cuff Size: Adult)   Pulse 75   Temp 99.2 °F (37.3 °C) (Oral)   Resp 16   Ht 182.9 cm (72.01\")   Wt 80.8 kg (178 lb 3.2 oz)   SpO2 97%   BMI 24.16 kg/m²     Physical Exam  Constitutional:       Appearance: He is well-developed.      Comments: Wearing a face mask     HENT:      Head: Normocephalic and atraumatic.      Nose: Congestion present.   Eyes:      Conjunctiva/sclera: Conjunctivae normal.   Cardiovascular:      Rate and Rhythm: Normal rate.   Pulmonary:      Effort: Pulmonary effort is normal. No respiratory distress.      Breath sounds: Normal breath sounds.      Comments: Dry cough intermittently during exam  Musculoskeletal:         General: Normal range of motion.      Cervical back: Normal range of motion.   Skin:     General: Skin is warm and dry.      Findings: No rash.   Neurological:      Mental Status: He is alert and oriented to person, place, and time.   Psychiatric:         Behavior: Behavior normal.        Result Review :                 Assessment and Plan    Diagnoses and all orders for this visit:    1. Low grade fever " (Primary)  Assessment & Plan:  Discussed taking Tylenol as needed fever or discomfort.  Patient encouraged to drink plenty of fluids.  Instructed to stay off work until Covid and flu test are back from other providers office.      2. Cough  Assessment & Plan:  Drink plenty of fluids.      3. Chills  Assessment & Plan:  Recommended rest, fluids, and Tylenol for relief.  Will await results from other providers office for flu and Covid.           Follow Up   No follow-ups on file.  Patient was given instructions and counseling regarding his condition or for health maintenance advice. Please see specific information pulled into the AVS if appropriate.

## 2021-09-30 ENCOUNTER — TELEPHONE (OUTPATIENT)
Dept: CARDIOLOGY | Facility: CLINIC | Age: 56
End: 2021-09-30

## 2021-09-30 NOTE — TELEPHONE ENCOUNTER
When he doubled bystolic to 20 mg there was no change in his bp  He switched back to carvedilol  He is staying on carvedilol  until after he is over COVID

## 2021-10-11 RX ORDER — FAMOTIDINE 40 MG/1
TABLET, FILM COATED ORAL
Qty: 180 TABLET | Refills: 3 | Status: SHIPPED | OUTPATIENT
Start: 2021-10-11

## 2021-10-11 RX ORDER — CARVEDILOL 25 MG/1
TABLET ORAL
Qty: 60 TABLET | Refills: 0 | Status: SHIPPED | OUTPATIENT
Start: 2021-10-11 | End: 2021-11-08

## 2021-11-02 ENCOUNTER — OFFICE VISIT (OUTPATIENT)
Dept: CARDIOLOGY | Facility: CLINIC | Age: 56
End: 2021-11-02

## 2021-11-02 VITALS
WEIGHT: 180 LBS | HEIGHT: 72 IN | BODY MASS INDEX: 24.38 KG/M2 | HEART RATE: 72 BPM | DIASTOLIC BLOOD PRESSURE: 68 MMHG | OXYGEN SATURATION: 97 % | SYSTOLIC BLOOD PRESSURE: 138 MMHG

## 2021-11-02 DIAGNOSIS — I10 ESSENTIAL HYPERTENSION: ICD-10-CM

## 2021-11-02 DIAGNOSIS — E78.2 MIXED HYPERLIPIDEMIA: ICD-10-CM

## 2021-11-02 DIAGNOSIS — I48.0 PAROXYSMAL ATRIAL FIBRILLATION (HCC): ICD-10-CM

## 2021-11-02 DIAGNOSIS — I25.10 CORONARY ARTERY DISEASE INVOLVING NATIVE CORONARY ARTERY OF NATIVE HEART WITHOUT ANGINA PECTORIS: Primary | ICD-10-CM

## 2021-11-02 PROBLEM — R06.02 SHORTNESS OF BREATH: Status: RESOLVED | Noted: 2018-08-29 | Resolved: 2021-11-02

## 2021-11-02 PROBLEM — R68.83 CHILLS: Status: RESOLVED | Noted: 2021-09-27 | Resolved: 2021-11-02

## 2021-11-02 PROBLEM — R05.9 COUGH: Status: RESOLVED | Noted: 2021-09-27 | Resolved: 2021-11-02

## 2021-11-02 PROBLEM — E29.1 TESTICULAR HYPOFUNCTION: Status: RESOLVED | Noted: 2021-09-16 | Resolved: 2021-11-02

## 2021-11-02 PROBLEM — R50.9 LOW GRADE FEVER: Status: RESOLVED | Noted: 2021-09-27 | Resolved: 2021-11-02

## 2021-11-02 PROBLEM — H66.004 RECURRENT ACUTE SUPPURATIVE OTITIS MEDIA OF RIGHT EAR WITHOUT SPONTANEOUS RUPTURE OF TYMPANIC MEMBRANE: Status: RESOLVED | Noted: 2020-05-13 | Resolved: 2021-11-02

## 2021-11-02 PROCEDURE — 93000 ELECTROCARDIOGRAM COMPLETE: CPT | Performed by: NURSE PRACTITIONER

## 2021-11-02 PROCEDURE — 99213 OFFICE O/P EST LOW 20 MIN: CPT | Performed by: NURSE PRACTITIONER

## 2021-11-02 RX ORDER — RAMIPRIL 2.5 MG/1
1 CAPSULE ORAL DAILY
COMMUNITY
End: 2021-12-20 | Stop reason: SDUPTHER

## 2021-11-02 NOTE — PROGRESS NOTES
Cardiology Office Follow Up Visit      Primary Care Provider:  Yue Adamson MD    Reason for f/u:     Coronary artery disease  Hypertension  Dyslipidemia      Subjective     CC:    Denies chest pain or dyspnea    History of Present Illness       Emmanuel Carpenter is a 56 y.o. male.  The patient has a past medical history of coronary artery disease.  In August 2018 he underwent cardiac catheterization which showed three-vessel coronary artery disease and significant left main stenosis.  He underwent four-vessel bypass surgery.  Postoperatively he did have paroxysmal atrial fibrillation.  He previously was on amiodarone which has been discontinued and the patient has been maintaining sinus rhythm.     In 2019 the patient underwent a carotid Doppler and VANDANA studies that were unremarkable.     The patient actively participates in cardiac rehab and brings a list of his blood pressures from home for review.     He denies any current or recent chest pain, dyspnea, PND, orthopnea, palpitations, near syncope, lower extremity edema or feelings of his heart racing.  He is compliant with medical therapy.      Patient recently had a Covid infection.  During that time he changed his Bystolic back to carvedilol 25 mg twice daily.  Also recently he was evaluated by his PCP and was started on low-dose Altace 2.5 mg daily.            Past Medical History:   Diagnosis Date   • Actinic keratosis    • Acute kidney injury (HCC) 10/03/2018    Consulted by Dr. Nunez   • Aortic regurgitation 2016   • Aortic stenosis 10/02/2018    L Main Noted on Cardiac Cath   • Atrial fibrillation (HCC)    • Bronchitis    • Cataract    • Coronary artery disease    • Detached retina    • Family history of heart disease     Enlarged Hearts & Valve Replacements   • GERD (gastroesophageal reflux disease)    • History of chest x-ray 10/5/18-[facility]    Stable but Small L Apical Pneumothorax   • History of echocardiogram 03/23/16-[facility]    Mild Concentrive L  Ventricular Hypertrophy; Moderate Aortic Reg; Mild Mitral Reg   • HLD (hyperlipidemia)    • Hx of chest x-ray 10/3/18-[facility]    Small L Apical Pneumothorax    • Hypertension    • Hypogonadism in male    • Hypokalemia    • Left ventricular hypertrophy by electrocardiogram     in the past by echo- mild.    • Mild concentric left ventricular hypertrophy 03/23/2016    Noted on Echo   • Pelvis tilted    • Pneumothorax 10/05/2018    Small Noted on Chest XR   • Polycythemia    • Recurrent acute suppurative otitis media of right ear without spontaneous rupture of tympanic membrane 5/13/2020   • SVT (supraventricular tachycardia) (HCC) Hx    S/p Ablation in 2006   • Testicular hypofunction 9/16/2021       Past Surgical History:   Procedure Laterality Date   • APPENDECTOMY  1990   • CARDIAC ABLATION  2006   • CARDIAC CATHETERIZATION Left 10/2/18-[facility]    w/Arteriography/Angiography-Dr. Hamlin--100% Occulsion of RCA; Significant L Main Stenosis   • CHOLECYSTECTOMY  2004   • CORONARY ARTERY BYPASS GRAFT  10/03/2018    urgent X4   Dr Farris         Current Outpatient Medications:   •  carvedilol (COREG) 25 MG tablet, TAKE 1 TABLET BY MOUTH TWICE DAILY, Disp: 60 tablet, Rfl: 0  •  clopidogrel (PLAVIX) 75 MG tablet, TAKE 1 TABLET BY MOUTH DAILY, Disp: 90 tablet, Rfl: 1  •  Ergocalciferol (Vitamin D2) 50 MCG (2000 UT) tablet, Take 1 tablet by mouth Daily., Disp: , Rfl:   •  ezetimibe (Zetia) 10 MG tablet, Take 1 tablet by mouth Daily., Disp: 90 tablet, Rfl: 3  •  famotidine (PEPCID) 40 MG tablet, TAKE 1 TABLET BY MOUTH TWICE DAILY, Disp: 180 tablet, Rfl: 3  •  ramipril (ALTACE) 2.5 MG capsule, 1 capsule Daily., Disp: , Rfl:   •  simvastatin (Zocor) 40 MG tablet, Take 1 tablet by mouth Every Night., Disp: 90 tablet, Rfl: 3  •  Testosterone Cypionate (Depo-Testosterone) 200 MG/ML injection, Inject  into the appropriate muscle as directed by prescriber Every 14 (Fourteen) Days. 0.2 mL IM or SQ every other day. , Disp: , Rfl:     Social  "History     Socioeconomic History   • Marital status:      Spouse name: Fely   Tobacco Use   • Smoking status: Never Smoker   • Smokeless tobacco: Never Used   Vaping Use   • Vaping Use: Never used   Substance and Sexual Activity   • Alcohol use: No   • Drug use: No   • Sexual activity: Defer       Family History   Problem Relation Age of Onset   • Heart defect Maternal Grandmother         Enlarged Heart   • Atrial fibrillation Maternal Grandmother    • Diabetes Maternal Grandmother    • Hypertension Mother    • Melanoma Father    • Diabetes Father    • Heart disease Maternal Uncle    • Diabetes Paternal Grandmother        The following portions of the patient's history were reviewed and updated as appropriate: allergies, current medications, past family history, past medical history, past social history, past surgical history and problem list.    Review of Systems   Constitutional: Negative for decreased appetite and diaphoresis.   HENT: Negative for congestion, hearing loss and nosebleeds.    Cardiovascular: Negative for chest pain, claudication, dyspnea on exertion, irregular heartbeat, leg swelling, near-syncope, orthopnea, palpitations, paroxysmal nocturnal dyspnea and syncope.   Respiratory: Negative for cough, shortness of breath and sleep disturbances due to breathing.    Endocrine: Negative for polyuria.   Hematologic/Lymphatic: Does not bruise/bleed easily.   Skin: Negative for itching and rash.   Musculoskeletal: Negative for back pain, muscle weakness and myalgias.   Gastrointestinal: Negative for abdominal pain, change in bowel habit and nausea.   Genitourinary: Negative for dysuria, flank pain, frequency and hesitancy.   Neurological: Negative for dizziness, tremors and weakness.   Psychiatric/Behavioral: Negative for altered mental status. The patient does not have insomnia.      /68   Pulse 72   Ht 182.9 cm (72\")   Wt 81.6 kg (180 lb)   SpO2 97%   BMI 24.41 kg/m² .  Objective "     Constitutional:       General: Not in acute distress.     Appearance: Normal appearance. Well-developed.   Eyes:      Pupils: Pupils are equal, round, and reactive to light.   HENT:      Head: Normocephalic and atraumatic.   Neck:      Vascular: No JVD.   Pulmonary:      Effort: Pulmonary effort is normal.      Breath sounds: Normal breath sounds.   Cardiovascular:      Normal rate. Regular rhythm.   Pulses:     Intact distal pulses.   Edema:     Peripheral edema absent.   Abdominal:      General: There is no distension.      Palpations: Abdomen is soft.      Tenderness: There is no abdominal tenderness.   Musculoskeletal: Normal range of motion.      Cervical back: Normal range of motion and neck supple. Skin:     General: Skin is warm and dry.   Neurological:      Mental Status: Alert and oriented to person, place, and time.             ECG 12 Lead    Date/Time: 11/2/2021 2:47 PM  Performed by: Amy Bill APRN  Authorized by: Amy Bill APRN   Comparison: compared with previous ECG   Similar to previous ECG  Rhythm: sinus rhythm  Rate: normal  BPM: 72  Conduction: incomplete right bundle branch block  ST Segments: ST segments normal  T Waves: T waves normal  QRS axis: normal  Other: no other findings    Clinical impression: non-specific ECG            EKG ordered by and reviewed by me in office         Diagnoses and all orders for this visit:    1. Coronary artery disease involving native coronary artery of native heart without angina pectoris (Primary)  Comments:  History of previous bypass surgery in 2018.  No recent signs or symptoms to suggest unstable angina.  Actively participates in cardiac rehab    2. Essential hypertension  Comments:  Blood pressures have been running on the high end.  He recently resumed carvedilol and Altase 2.5 mg daily was added.  Blood pressure stable today    3. Paroxysmal atrial fibrillation (HCC)  Comments:  No recent recurrent signs or symptoms to suggest  paroxysmal A. fib.  He had brief period of A. fib post bypass surgery.    4. Mixed hyperlipidemia  Comments:  Remains on statin therapy           MEDICAL DECISION MAKING:      Patient has had a recent change in antihypertensive therapy.  We have previously prescribed Bystolic but during his recent Covid infection he switch back to carvedilol 25 mg twice daily.  In addition he was recently added Altace 2.5 mg daily by his PCP.    Since that time his blood pressures have been well controlled.  He has been participating in cardiac rehab and they have been stable.    He has had no recent chest pain or signs of unstable angina.  He is denying any dyspnea with exertion.    He has recuperated well from his Covid infection.    At this time I made no changes in his medicine we will continue the current treatment.    We will consider ordering a noninvasive ischemic evaluation at his next visit with Dr. Hamlin    Patient will continue Plavix, carvedilol, statin and now ramipril.    I have asked him to contact the office if he should develop any new or worsening problems

## 2021-11-08 RX ORDER — CARVEDILOL 25 MG/1
TABLET ORAL
Qty: 60 TABLET | Refills: 6 | Status: SHIPPED | OUTPATIENT
Start: 2021-11-08 | End: 2021-12-20

## 2021-11-24 ENCOUNTER — TELEPHONE (OUTPATIENT)
Dept: CARDIOLOGY | Facility: CLINIC | Age: 56
End: 2021-11-24

## 2021-11-24 NOTE — TELEPHONE ENCOUNTER
Can he cut his beta blocker in half and increase his ramipril   He wants to try this for a few weeks and see what happens

## 2021-12-03 RX ORDER — CLOPIDOGREL BISULFATE 75 MG/1
75 TABLET ORAL DAILY
Qty: 90 TABLET | Refills: 1 | Status: SHIPPED | OUTPATIENT
Start: 2021-12-03 | End: 2022-01-28 | Stop reason: SDUPTHER

## 2021-12-17 RX ORDER — TRIAMCINOLONE ACETONIDE 1 MG/G
CREAM TOPICAL
COMMUNITY
Start: 2021-11-12 | End: 2021-12-20

## 2021-12-17 NOTE — PROGRESS NOTES
Date of Office Visit: 2021  Encounter Provider: Dr. Timo Hamlin    Place of Service: [facility] CARDIOLOGY [place]  Patient Name: Emmanuel Carpenter  :1965  Yue Adamson MD    Chief Complaint   Patient presents with   • Follow-up     discuss medication   • Coronary Artery Disease   • Fatigue     excess sleeping    • Slow Heart Rate     History of Present Illness:    I am pleased to see Mr. Carpenter in my office today as a follow-up.     As you know, patient is 56years old white gentleman whose past medical history is significant for hypertension, hyperlipidemia, CAD, CABG, who came today for follow-up.     In 2018, patient was referred to me for symptom of angina pectoris.  Patient underwent stress test which was abnormal.  Patient underwent cardiac catheterization which showed three-vessel coronary artery disease with significant left main stenosis.  Patient underwent CABG x4.  In 2019, patient underwent VANDANA and carotid Doppler they were unremarkable.    Since the previous visit, patient is overall doing well.  Patient denies any symptom of angina pectoris or congestive heart failure.  He is doing exercises at rehab 4 days.  He does not report symptom of chest pain or angina pectoris.  Patient denies any orthopnea, PND, syncope or presyncope.  No leg edema noted.    At this stage, I would recommend to decrease carvedilol to 12.5 mg twice daily and increase ramipril to 5 mg daily.  Patient is advised to monitor blood pressure.  Patient complain of relative bradycardia and fatigue and tiredness due to low pulse.      Past Medical History:   Diagnosis Date   • Actinic keratosis    • Acute kidney injury (HCC) 10/03/2018    Consulted by Dr. Nunez   • Aortic regurgitation    • Aortic stenosis 10/02/2018    L Main Noted on Cardiac Cath   • Atrial fibrillation (HCC)    • Bronchitis    • Cataract    • Coronary artery disease    • Detached retina    • Family history of heart disease      Enlarged Hearts & Valve Replacements   • GERD (gastroesophageal reflux disease)    • History of chest x-ray 10/5/18-[facility]    Stable but Small L Apical Pneumothorax   • History of echocardiogram 03/23/16-[facility]    Mild Concentrive L Ventricular Hypertrophy; Moderate Aortic Reg; Mild Mitral Reg   • HLD (hyperlipidemia)    • Hx of chest x-ray 10/3/18-[facility]    Small L Apical Pneumothorax    • Hypertension    • Hypogonadism in male    • Hypokalemia    • Left ventricular hypertrophy by electrocardiogram     in the past by echo- mild.    • Mild concentric left ventricular hypertrophy 03/23/2016    Noted on Echo   • Pelvis tilted    • Pneumothorax 10/05/2018    Small Noted on Chest XR   • Polycythemia    • Recurrent acute suppurative otitis media of right ear without spontaneous rupture of tympanic membrane 5/13/2020   • SVT (supraventricular tachycardia) (HCC) Hx    S/p Ablation in 2006   • Testicular hypofunction 9/16/2021         Past Surgical History:   Procedure Laterality Date   • APPENDECTOMY  1990   • CARDIAC ABLATION  2006   • CARDIAC CATHETERIZATION Left 10/2/18-[facility]    w/Arteriography/Angiography-Dr. Hamlin--100% Occulsion of RCA; Significant L Main Stenosis   • CHOLECYSTECTOMY  2004   • CORONARY ARTERY BYPASS GRAFT  10/03/2018    urgent X4   Dr Farris           Current Outpatient Medications:   •  carvedilol (COREG) 12.5 MG tablet, Take 2 tablets by mouth 2 (Two) Times a Day., Disp: , Rfl:   •  clopidogrel (PLAVIX) 75 MG tablet, TAKE 1 TABLET BY MOUTH DAILY, Disp: 90 tablet, Rfl: 1  •  co-enzyme Q-10 30 MG capsule, Take 30 mg by mouth Daily., Disp: , Rfl:   •  ezetimibe (Zetia) 10 MG tablet, Take 1 tablet by mouth Daily., Disp: 90 tablet, Rfl: 3  •  famotidine (PEPCID) 40 MG tablet, TAKE 1 TABLET BY MOUTH TWICE DAILY, Disp: 180 tablet, Rfl: 3  •  ramipril (ALTACE) 2.5 MG capsule, Take 1 capsule by mouth Daily., Disp: 90 capsule, Rfl: 1  •  simvastatin (Zocor) 40 MG tablet, Take 1 tablet by mouth Every Night., Disp: 90  "tablet, Rfl: 3  •  Testosterone Cypionate (Depo-Testosterone) 200 MG/ML injection, Inject  into the appropriate muscle as directed by prescriber Every 14 (Fourteen) Days. 0.2 mL IM or SQ every other day. , Disp: , Rfl:       Social History     Socioeconomic History   • Marital status:      Spouse name: Fely   Tobacco Use   • Smoking status: Never Smoker   • Smokeless tobacco: Never Used   Vaping Use   • Vaping Use: Never used   Substance and Sexual Activity   • Alcohol use: No   • Drug use: No   • Sexual activity: Defer         Review of Systems   Constitutional: Positive for malaise/fatigue. Negative for chills and fever.   HENT: Negative for ear discharge and nosebleeds.    Eyes: Negative for discharge and redness.   Cardiovascular: Negative for chest pain, orthopnea, palpitations, paroxysmal nocturnal dyspnea and syncope.   Respiratory: Negative for cough, shortness of breath and wheezing.    Endocrine: Negative for heat intolerance.   Skin: Negative for rash.   Musculoskeletal: Negative for arthritis and myalgias.   Gastrointestinal: Negative for abdominal pain, melena, nausea and vomiting.   Genitourinary: Negative for dysuria and hematuria.   Neurological: Negative for dizziness, light-headedness, numbness and tremors.   Psychiatric/Behavioral: Negative for depression. The patient is not nervous/anxious.        Procedures    Procedures    No orders to display           Objective:    /76   Pulse 64   Ht 182.9 cm (72.01\")   Wt 81.2 kg (179 lb)   SpO2 98%   BMI 24.27 kg/m²         Constitutional:       Appearance: Well-developed.   Eyes:      General: No scleral icterus.        Right eye: No discharge.   HENT:      Head: Normocephalic and atraumatic.   Neck:      Thyroid: No thyromegaly.      Lymphadenopathy: No cervical adenopathy.   Pulmonary:      Effort: Pulmonary effort is normal. No respiratory distress.      Breath sounds: Normal breath sounds. No wheezing. No rales.   Cardiovascular: "      Normal rate. Regular rhythm.      No gallop.   Abdominal:      Tenderness: There is no abdominal tenderness.   Skin:     Findings: No erythema or rash.   Neurological:      Mental Status: Alert and oriented to person, place, and time.             Assessment:       Diagnosis Plan   1. Coronary artery disease involving native coronary artery of native heart without angina pectoris  carvedilol (COREG) 12.5 MG tablet    ramipril (ALTACE) 2.5 MG capsule   2. Paroxysmal atrial fibrillation (HCC)  carvedilol (COREG) 12.5 MG tablet    ramipril (ALTACE) 2.5 MG capsule   3. Essential hypertension  carvedilol (COREG) 12.5 MG tablet    ramipril (ALTACE) 2.5 MG capsule   4. S/P CABG (coronary artery bypass graft)  carvedilol (COREG) 12.5 MG tablet    ramipril (ALTACE) 2.5 MG capsule   5. Mixed hyperlipidemia  carvedilol (COREG) 12.5 MG tablet    ramipril (ALTACE) 2.5 MG capsule   6. Other fatigue  carvedilol (COREG) 12.5 MG tablet    ramipril (ALTACE) 2.5 MG capsule   7. Gastroesophageal reflux disease, unspecified whether esophagitis present  carvedilol (COREG) 12.5 MG tablet    ramipril (ALTACE) 2.5 MG capsule            Plan:       MDM:    1.  CAD:    Patient is totally asymptomatic.  Continue current treatment    2.  Hypertension:    Blood pressure is better.  However he wants to decrease the dose of carvedilol.  Increase ramipril    3.  Hyperlipidemia:    Patient is on simvastatin    4.  Relative bradycardia:    I will decrease carvedilol and increase ramipril

## 2021-12-20 ENCOUNTER — OFFICE VISIT (OUTPATIENT)
Dept: CARDIOLOGY | Facility: CLINIC | Age: 56
End: 2021-12-20

## 2021-12-20 VITALS
SYSTOLIC BLOOD PRESSURE: 125 MMHG | BODY MASS INDEX: 24.24 KG/M2 | HEART RATE: 64 BPM | HEIGHT: 72 IN | DIASTOLIC BLOOD PRESSURE: 76 MMHG | OXYGEN SATURATION: 98 % | WEIGHT: 179 LBS

## 2021-12-20 DIAGNOSIS — K21.9 GASTROESOPHAGEAL REFLUX DISEASE, UNSPECIFIED WHETHER ESOPHAGITIS PRESENT: ICD-10-CM

## 2021-12-20 DIAGNOSIS — I10 ESSENTIAL HYPERTENSION: ICD-10-CM

## 2021-12-20 DIAGNOSIS — I25.10 CORONARY ARTERY DISEASE INVOLVING NATIVE CORONARY ARTERY OF NATIVE HEART WITHOUT ANGINA PECTORIS: Primary | ICD-10-CM

## 2021-12-20 DIAGNOSIS — R53.83 OTHER FATIGUE: ICD-10-CM

## 2021-12-20 DIAGNOSIS — E78.2 MIXED HYPERLIPIDEMIA: ICD-10-CM

## 2021-12-20 DIAGNOSIS — Z95.1 S/P CABG (CORONARY ARTERY BYPASS GRAFT): ICD-10-CM

## 2021-12-20 DIAGNOSIS — I48.0 PAROXYSMAL ATRIAL FIBRILLATION (HCC): ICD-10-CM

## 2021-12-20 PROCEDURE — 99214 OFFICE O/P EST MOD 30 MIN: CPT | Performed by: INTERNAL MEDICINE

## 2021-12-20 RX ORDER — CARVEDILOL 12.5 MG/1
25 TABLET ORAL 2 TIMES DAILY
Start: 2021-12-20 | End: 2022-01-28

## 2021-12-20 RX ORDER — RAMIPRIL 2.5 MG/1
2.5 CAPSULE ORAL DAILY
Qty: 90 CAPSULE | Refills: 1 | Status: SHIPPED | OUTPATIENT
Start: 2021-12-20

## 2022-01-28 ENCOUNTER — TELEPHONE (OUTPATIENT)
Dept: CARDIOLOGY | Facility: CLINIC | Age: 57
End: 2022-01-28

## 2022-01-28 DIAGNOSIS — E78.2 MIXED HYPERLIPIDEMIA: ICD-10-CM

## 2022-01-28 DIAGNOSIS — I25.10 CORONARY ARTERY DISEASE INVOLVING NATIVE CORONARY ARTERY OF NATIVE HEART WITHOUT ANGINA PECTORIS: ICD-10-CM

## 2022-01-28 RX ORDER — CARVEDILOL 12.5 MG/1
12.5 TABLET ORAL 2 TIMES DAILY
Qty: 180 TABLET | Refills: 2 | Status: SHIPPED | OUTPATIENT
Start: 2022-01-28 | End: 2022-04-20

## 2022-01-28 RX ORDER — SIMVASTATIN 40 MG
40 TABLET ORAL NIGHTLY
Qty: 90 TABLET | Refills: 2 | Status: SHIPPED | OUTPATIENT
Start: 2022-01-28 | End: 2023-01-19

## 2022-01-28 RX ORDER — EZETIMIBE 10 MG/1
10 TABLET ORAL DAILY
Qty: 90 TABLET | Refills: 2 | Status: SHIPPED | OUTPATIENT
Start: 2022-01-28 | End: 2023-01-19

## 2022-01-28 RX ORDER — CLOPIDOGREL BISULFATE 75 MG/1
75 TABLET ORAL DAILY
Qty: 90 TABLET | Refills: 2 | Status: SHIPPED | OUTPATIENT
Start: 2022-01-28 | End: 2022-10-18

## 2022-01-28 NOTE — TELEPHONE ENCOUNTER
Patient would like to all his prescriptions to be sent to the [facility] in [place] and to please update his carvedilol 12.5mg so he doesn't have to cut the pill in half.

## 2022-04-20 ENCOUNTER — TELEPHONE (OUTPATIENT)
Dept: CARDIOLOGY | Facility: CLINIC | Age: 57
End: 2022-04-20

## 2022-04-20 RX ORDER — CARVEDILOL 25 MG/1
25 TABLET ORAL 2 TIMES DAILY
Qty: 60 TABLET | Refills: 1 | Status: SHIPPED | OUTPATIENT
Start: 2022-04-20 | End: 2022-04-20

## 2022-04-20 RX ORDER — CARVEDILOL 25 MG/1
TABLET ORAL
Qty: 180 TABLET | Refills: 1 | Status: SHIPPED | OUTPATIENT
Start: 2022-04-20 | End: 2022-07-25 | Stop reason: SDUPTHER

## 2022-04-20 NOTE — TELEPHONE ENCOUNTER
Patient was on carvedilol 25mg and now he is taking 12.5mg in the am and 25mg in the pm and he wanted to know if Dr. Hamlin would right that as a script because he said when Dr. Hamlin decreased it to 25mg from the 50mg orginally he was having issues and stated he started taking an extra tablet.

## 2022-05-24 PROBLEM — Z95.5 HISTORY OF CORONARY ARTERY STENT PLACEMENT: Status: ACTIVE | Noted: 2022-01-27

## 2022-06-01 NOTE — PROGRESS NOTES
Date of Office Visit: 2022  Encounter Provider: Dr. Timo Hamlin  Place of Service: [facility] CARDIOLOGY [place]  Patient Name: Emmanuel Carpenter  :1965  Patricia Wade APRN    Chief Complaint   Patient presents with   • Coronary Artery Disease   • Atrial Fibrillation   • Hypertension   • Hyperlipidemia   • Follow-up     History of Present Illness    I am pleased to see Mr. Carpenter in my office today as a follow-up.     As you know, patient is 57 years old white gentleman whose past medical history is significant for hypertension, hyperlipidemia, CAD, CABG, who came today for follow-up.     In 2018, patient was referred to me for symptom of angina pectoris.  Patient underwent stress test which was abnormal.  Patient underwent cardiac catheterization which showed three-vessel coronary artery disease with significant left main stenosis.  Patient underwent CABG x4.  In , patient underwent VANDANA and carotid Doppler they were unremarkable.    Patient came today for routine follow-up.  Patient denies any symptom of chest pain or tightness or heaviness.  Patient denies any orthopnea, PND, syncope or presyncope.  No leg edema noted.    EKG showed sinus rhythm.  RBBB is noted.    Overall patient is doing well.  I am overall very pleased with the patient condition.  Patient continues to do aerobic activity.  His blood pressure is very well controlled.  Consider stress test in future        Past Medical History:   Diagnosis Date   • Actinic keratosis    • Acute kidney injury (HCC) 10/03/2018    Consulted by Dr. Nunez   • Aortic regurgitation 2016   • Aortic stenosis 10/02/2018    L Main Noted on Cardiac Cath   • Atrial fibrillation (HCC)    • Bronchitis    • Cataract    • Coronary artery disease    • Detached retina    • Family history of heart disease     Enlarged Hearts & Valve Replacements   • GERD (gastroesophageal reflux disease)    • History of chest x-ray 10/5/18-[facility]    Stable but Small L  Apical Pneumothorax   • History of echocardiogram 03/23/16-[facility]    Mild Concentrive L Ventricular Hypertrophy; Moderate Aortic Reg; Mild Mitral Reg   • HLD (hyperlipidemia)    • Hx of chest x-ray 10/3/18-[facility]    Small L Apical Pneumothorax    • Hypertension    • Hypogonadism in male    • Hypokalemia    • Left ventricular hypertrophy by electrocardiogram     in the past by echo- mild.    • Mild concentric left ventricular hypertrophy 03/23/2016    Noted on Echo   • Pelvis tilted    • Pneumothorax 10/05/2018    Small Noted on Chest XR   • Polycythemia    • Recurrent acute suppurative otitis media of right ear without spontaneous rupture of tympanic membrane 5/13/2020   • SVT (supraventricular tachycardia) (HCC) Hx    S/p Ablation in 2006   • Testicular hypofunction 9/16/2021         Past Surgical History:   Procedure Laterality Date   • APPENDECTOMY  1990   • CARDIAC ABLATION  2006   • CARDIAC CATHETERIZATION Left 10/2/18-[facility]    w/Arteriography/Angiography-Dr. Hamlin--100% Occulsion of RCA; Significant L Main Stenosis   • CHOLECYSTECTOMY  2004   • CORONARY ARTERY BYPASS GRAFT  10/03/2018    urgent X4   Dr Farris           Current Outpatient Medications:   •  carvedilol (COREG) 12.5 MG tablet, Take 12.5 mg by mouth Daily., Disp: , Rfl:   •  carvedilol (COREG) 25 MG tablet, TAKE 1 TABLET BY MOUTH TWICE DAILY (Patient taking differently: Take 25 mg by mouth Daily.), Disp: 180 tablet, Rfl: 1  •  chorionic gonadotropin (PREGNYL) 23423 units injection, Pregnyl 10,000 unit intramuscular solution  Inject 830 units 3 times a week by intramuscular route for 30 days., Disp: , Rfl:   •  clopidogrel (PLAVIX) 75 MG tablet, Take 1 tablet by mouth Daily., Disp: 90 tablet, Rfl: 2  •  co-enzyme Q-10 30 MG capsule, Take 30 mg by mouth Daily., Disp: , Rfl:   •  ezetimibe (Zetia) 10 MG tablet, Take 1 tablet by mouth Daily., Disp: 90 tablet, Rfl: 2  •  famotidine (PEPCID) 40 MG tablet, TAKE 1 TABLET BY MOUTH TWICE DAILY, Disp: 180 tablet, Rfl:  "3  •  ramipril (ALTACE) 2.5 MG capsule, Take 1 capsule by mouth Daily., Disp: 90 capsule, Rfl: 1  •  ramipril (ALTACE) 5 MG capsule, ramipril 5 mg capsule  TAKE 1 CAPSULE BY MOUTH EVERY DAY, Disp: , Rfl:   •  simvastatin (Zocor) 40 MG tablet, Take 1 tablet by mouth Every Night., Disp: 90 tablet, Rfl: 2      Social History     Socioeconomic History   • Marital status:      Spouse name: Fely   Tobacco Use   • Smoking status: Never Smoker   • Smokeless tobacco: Never Used   Vaping Use   • Vaping Use: Never used   Substance and Sexual Activity   • Alcohol use: No   • Drug use: No   • Sexual activity: Defer         Review of Systems   Constitutional: Negative for chills and fever.   HENT: Negative for ear discharge and nosebleeds.    Eyes: Negative for discharge and redness.   Cardiovascular: Negative for chest pain, orthopnea, palpitations, paroxysmal nocturnal dyspnea and syncope.   Respiratory: Positive for shortness of breath. Negative for cough and wheezing.    Endocrine: Negative for heat intolerance.   Skin: Negative for rash.   Musculoskeletal: Positive for arthritis and joint pain. Negative for myalgias.   Gastrointestinal: Negative for abdominal pain, melena, nausea and vomiting.   Genitourinary: Negative for dysuria and hematuria.   Neurological: Negative for dizziness, light-headedness, numbness and tremors.   Psychiatric/Behavioral: Negative for depression. The patient is not nervous/anxious.        Procedures      ECG 12 Lead    Date/Time: 6/2/2022 1:20 PM  Performed by: Timo Hamlin MD  Authorized by: Timo Hamlin MD   Comparison: compared with previous ECG   Similar to previous ECG  Rhythm: sinus rhythm    Clinical impression: normal ECG            ECG 12 Lead    (Results Pending)           Objective:    /76 (BP Location: Left arm, Patient Position: Sitting, Cuff Size: Large Adult)   Pulse 60   Ht 182.9 cm (72.01\")   Wt 80.7 kg (178 lb)   BMI 24.14 kg/m²         Constitutional:       " Appearance: Well-developed.   Eyes:      General: No scleral icterus.        Right eye: No discharge.   HENT:      Head: Normocephalic and atraumatic.   Neck:      Thyroid: No thyromegaly.      Lymphadenopathy: No cervical adenopathy.   Pulmonary:      Effort: Pulmonary effort is normal. No respiratory distress.      Breath sounds: Normal breath sounds. No wheezing. No rales.   Cardiovascular:      Normal rate. Regular rhythm.      No gallop.   Abdominal:      Tenderness: There is no abdominal tenderness.   Skin:     Findings: No erythema or rash.   Neurological:      Mental Status: Alert and oriented to person, place, and time.             Assessment:       Diagnosis Plan   1. Coronary artery disease involving native coronary artery of native heart without angina pectoris  ECG 12 Lead   2. Essential hypertension  ECG 12 Lead   3. Mixed hyperlipidemia  ECG 12 Lead   4. Paroxysmal atrial fibrillation (HCC)  ECG 12 Lead            Plan:       MDM:    1.  CAD:    Patient is doing well.  Consider stress test next year    2.  Hypertension:    Blood pressure is very well controlled current treatment would be continued.    3.  Hyperlipidemia:    Patient is on simvastatin.  Repeat lipid panel in future.    4.  History of paroxysmal atrial fibrillation:    Patient had postoperative A. fib but at present he is in normal rhythm.

## 2022-06-02 ENCOUNTER — OFFICE VISIT (OUTPATIENT)
Dept: CARDIOLOGY | Facility: CLINIC | Age: 57
End: 2022-06-02

## 2022-06-02 VITALS
DIASTOLIC BLOOD PRESSURE: 76 MMHG | SYSTOLIC BLOOD PRESSURE: 122 MMHG | HEART RATE: 60 BPM | BODY MASS INDEX: 24.11 KG/M2 | HEIGHT: 72 IN | WEIGHT: 178 LBS

## 2022-06-02 DIAGNOSIS — E78.2 MIXED HYPERLIPIDEMIA: ICD-10-CM

## 2022-06-02 DIAGNOSIS — I48.0 PAROXYSMAL ATRIAL FIBRILLATION: ICD-10-CM

## 2022-06-02 DIAGNOSIS — I25.10 CORONARY ARTERY DISEASE INVOLVING NATIVE CORONARY ARTERY OF NATIVE HEART WITHOUT ANGINA PECTORIS: Primary | ICD-10-CM

## 2022-06-02 DIAGNOSIS — I10 ESSENTIAL HYPERTENSION: ICD-10-CM

## 2022-06-02 PROCEDURE — 93000 ELECTROCARDIOGRAM COMPLETE: CPT | Performed by: INTERNAL MEDICINE

## 2022-06-02 PROCEDURE — 99214 OFFICE O/P EST MOD 30 MIN: CPT | Performed by: INTERNAL MEDICINE

## 2022-07-15 ENCOUNTER — TELEPHONE (OUTPATIENT)
Dept: CARDIOLOGY | Facility: CLINIC | Age: 57
End: 2022-07-15

## 2022-07-15 NOTE — TELEPHONE ENCOUNTER
He had labs ran  His platelets were around 99 Could it be one of his medications causing it be be low

## 2022-07-25 RX ORDER — CARVEDILOL 12.5 MG/1
12.5 TABLET ORAL EVERY MORNING
Qty: 90 TABLET | Refills: 3 | Status: SHIPPED | OUTPATIENT
Start: 2022-07-25 | End: 2023-01-20 | Stop reason: SDUPTHER

## 2022-07-25 RX ORDER — CARVEDILOL 25 MG/1
25 TABLET ORAL EVERY EVENING
Qty: 90 TABLET | Refills: 3 | Status: SHIPPED | OUTPATIENT
Start: 2022-07-25 | End: 2023-01-20 | Stop reason: SDUPTHER

## 2022-08-03 ENCOUNTER — APPOINTMENT (OUTPATIENT)
Dept: LAB | Facility: HOSPITAL | Age: 57
End: 2022-08-03

## 2022-08-03 ENCOUNTER — CONSULT (OUTPATIENT)
Dept: ONCOLOGY | Facility: CLINIC | Age: 57
End: 2022-08-03

## 2022-08-03 VITALS
DIASTOLIC BLOOD PRESSURE: 77 MMHG | BODY MASS INDEX: 23.7 KG/M2 | WEIGHT: 175 LBS | TEMPERATURE: 97.1 F | HEART RATE: 73 BPM | SYSTOLIC BLOOD PRESSURE: 112 MMHG | OXYGEN SATURATION: 98 % | HEIGHT: 72 IN

## 2022-08-03 DIAGNOSIS — D69.6 THROMBOCYTOPENIA: Primary | ICD-10-CM

## 2022-08-03 LAB
BASOPHILS # BLD AUTO: 0.04 10*3/MM3 (ref 0–0.2)
BASOPHILS NFR BLD AUTO: 0.6 % (ref 0–1.5)
DEPRECATED RDW RBC AUTO: 41.8 FL (ref 37–54)
EOSINOPHIL # BLD AUTO: 0.18 10*3/MM3 (ref 0–0.4)
EOSINOPHIL NFR BLD AUTO: 2.5 % (ref 0.3–6.2)
ERYTHROCYTE [DISTWIDTH] IN BLOOD BY AUTOMATED COUNT: 12.9 % (ref 12.3–15.4)
HCT VFR BLD AUTO: 43.1 % (ref 37.5–51)
HGB BLD-MCNC: 14.7 G/DL (ref 13–17.7)
LYMPHOCYTES # BLD AUTO: 2.12 10*3/MM3 (ref 0.7–3.1)
LYMPHOCYTES NFR BLD AUTO: 29.7 % (ref 19.6–45.3)
MCH RBC QN AUTO: 30.5 PG (ref 26.6–33)
MCHC RBC AUTO-ENTMCNC: 34.1 G/DL (ref 31.5–35.7)
MCV RBC AUTO: 89.4 FL (ref 79–97)
MONOCYTES # BLD AUTO: 0.85 10*3/MM3 (ref 0.1–0.9)
MONOCYTES NFR BLD AUTO: 11.9 % (ref 5–12)
NEUTROPHILS NFR BLD AUTO: 3.95 10*3/MM3 (ref 1.7–7)
NEUTROPHILS NFR BLD AUTO: 55.3 % (ref 42.7–76)
PLATELET # BLD AUTO: 169 10*3/MM3 (ref 140–450)
PMV BLD AUTO: 9.6 FL (ref 6–12)
RBC # BLD AUTO: 4.82 10*6/MM3 (ref 4.14–5.8)
WBC NRBC COR # BLD: 7.14 10*3/MM3 (ref 3.4–10.8)

## 2022-08-03 PROCEDURE — 85025 COMPLETE CBC W/AUTO DIFF WBC: CPT | Performed by: INTERNAL MEDICINE

## 2022-08-03 PROCEDURE — 99204 OFFICE O/P NEW MOD 45 MIN: CPT | Performed by: INTERNAL MEDICINE

## 2022-10-18 RX ORDER — CLOPIDOGREL BISULFATE 75 MG/1
TABLET ORAL
Qty: 90 TABLET | Refills: 0 | Status: SHIPPED | OUTPATIENT
Start: 2022-10-18 | End: 2023-01-19

## 2023-01-03 ENCOUNTER — OFFICE VISIT (OUTPATIENT)
Dept: FAMILY MEDICINE CLINIC | Facility: CLINIC | Age: 58
End: 2023-01-03
Payer: COMMERCIAL

## 2023-01-03 VITALS
RESPIRATION RATE: 18 BRPM | BODY MASS INDEX: 24.3 KG/M2 | HEIGHT: 72 IN | HEART RATE: 63 BPM | WEIGHT: 179.4 LBS | OXYGEN SATURATION: 98 % | DIASTOLIC BLOOD PRESSURE: 80 MMHG | TEMPERATURE: 97.5 F | SYSTOLIC BLOOD PRESSURE: 132 MMHG

## 2023-01-03 DIAGNOSIS — I86.1 BILATERAL VARICOCELES: Primary | ICD-10-CM

## 2023-01-03 PROCEDURE — 99213 OFFICE O/P EST LOW 20 MIN: CPT | Performed by: FAMILY MEDICINE

## 2023-01-03 PROCEDURE — 1160F RVW MEDS BY RX/DR IN RCRD: CPT | Performed by: FAMILY MEDICINE

## 2023-01-03 PROCEDURE — 1159F MED LIST DOCD IN RCRD: CPT | Performed by: FAMILY MEDICINE

## 2023-01-03 NOTE — PROGRESS NOTES
Subjective   Emmanuel Carpenter is a 57 y.o. male.   Chief Complaint   Patient presents with   • testicular issues       History of Present Illness   Presents to the office today with a complaint of testicle problems.  I am not seeing Emmanuel in over a year and a half.  He transferred his primary care to [facility].  Apparently he had some swelling in his testicles and she ordered an ultrasound.  This was apparently done over [facility].  I do not have access to that report.  He tells me that showed bilateral varicoceles.  He talked to 3 different doctors over a first urology and none of them would repair it.  He is concerned because he has very thick veins that standout on the scrotum and gets worse when he has an erection.    He also has a history of hypogonadism and actually became polycythemic.  I deferred to manage his testosterone.  It sounds like he is happy with whoever is managing his testosterone issues with hCG and clomiphene.      Patient Active Problem List    Diagnosis Date Noted   • History of coronary artery stent placement 01/27/2022   • Eczema 09/16/2021   • Disorder of testis 09/16/2021   • Other fatigue 09/01/2021   • Mixed hyperlipidemia 04/22/2020   • Syncope and collapse 12/13/2019   • S/P CABG (coronary artery bypass graft) 12/13/2019   • Essential hypertension 10/22/2019     Note Last Updated: 10/22/2019     Patient's log blood pressures been reviewed has periods of systolic blood pressures in the 140s and diastolics greater than 80s     • Gastroesophageal reflux disease 11/13/2018   • Coronary artery disease involving native coronary artery of native heart without angina pectoris 11/13/2018     Note Last Updated: 9/26/2019     S/p CABG     • Paroxysmal atrial fibrillation (HCC) 11/13/2018     Note Last Updated: 9/23/2020     Post-op A-fib.  S/p ablation.  No sustained a-fib for years.     • Dysphagia 08/30/2018   • Varicose veins of bilateral lower extremities with other  complications 06/03/2016   • Anxiety 01/12/2015   • Erythrocytosis 01/12/2015   • Primary male hypogonadism 01/12/2015   • Hypokalemia 08/29/2013   • Other specified disorders of adrenal gland (HCC) 08/01/2013   • Male hypogonadism 07/19/2013           Past Surgical History:   Procedure Laterality Date   • APPENDECTOMY  1990   • CARDIAC ABLATION  2006   • CARDIAC CATHETERIZATION Left 10/2/18-[facility]    w/Arteriography/Angiography-Dr. Hamlin--100% Occulsion of RCA; Significant L Main Stenosis   • CHOLECYSTECTOMY  2004   • CORONARY ARTERY BYPASS GRAFT  10/03/2018    urgent X4   Dr Farris     Current Outpatient Medications on File Prior to Visit   Medication Sig   • carvedilol (COREG) 12.5 MG tablet Take 1 tablet by mouth Every Morning.   • carvedilol (COREG) 25 MG tablet Take 1 tablet by mouth Every Evening.   • chorionic gonadotropin (PREGNYL) 72758 units injection Pregnyl 10,000 unit intramuscular solution   Inject 830 units 3 times a week by intramuscular route for 30 days.   • clopidogrel (PLAVIX) 75 MG tablet Take 1 tablet by mouth once daily   • co-enzyme Q-10 30 MG capsule Take 30 mg by mouth Daily.   • ezetimibe (Zetia) 10 MG tablet Take 1 tablet by mouth Daily.   • famotidine (PEPCID) 40 MG tablet TAKE 1 TABLET BY MOUTH TWICE DAILY   • ramipril (ALTACE) 5 MG capsule ramipril 5 mg capsule   TAKE 1 CAPSULE BY MOUTH EVERY DAY   • ramipril (ALTACE) 2.5 MG capsule Take 1 capsule by mouth Daily.   • simvastatin (Zocor) 40 MG tablet Take 1 tablet by mouth Every Night.     No current facility-administered medications on file prior to visit.     Allergies   Allergen Reactions   • Other Other (See Comments)     Perfumes      Social History     Socioeconomic History   • Marital status:      Spouse name: Fely   Tobacco Use   • Smoking status: Never     Passive exposure: Never   • Smokeless tobacco: Never   Vaping Use   • Vaping Use: Never used   Substance and Sexual Activity   • Alcohol use: No   • Drug use: No   •  Sexual activity: Defer     Family History   Problem Relation Age of Onset   • Heart defect Maternal Grandmother         Enlarged Heart   • Atrial fibrillation Maternal Grandmother    • Diabetes Maternal Grandmother    • Hypertension Mother    • Melanoma Father    • Diabetes Father    • Heart disease Maternal Uncle    • Diabetes Paternal Grandmother        Review of Systems    Objective   /80 (BP Location: Right arm, Patient Position: Sitting, Cuff Size: Adult)   Pulse 63   Temp 97.5 °F (36.4 °C) (Infrared)   Resp 18   Ht 182.9 cm (72.01\")   Wt 81.4 kg (179 lb 6.4 oz)   SpO2 98%   BMI 24.32 kg/m²   Physical Exam  Constitutional:       Appearance: He is well-developed.      Comments: Wearing a face mask     HENT:      Head: Normocephalic and atraumatic.   Eyes:      Conjunctiva/sclera: Conjunctivae normal.   Cardiovascular:      Rate and Rhythm: Normal rate.   Pulmonary:      Effort: Pulmonary effort is normal.   Genitourinary:     Comments: Bilateral scrotum appear normal.  He does have some varicosed, dilated veins on the inferior scrotum.    Musculoskeletal:         General: Normal range of motion.      Cervical back: Normal range of motion.   Skin:     General: Skin is warm and dry.      Findings: No rash.   Neurological:      Mental Status: He is alert and oriented to person, place, and time.   Psychiatric:         Behavior: Behavior normal.           No visits with results within 4 Month(s) from this visit.   Latest known visit with results is:   Consult on 08/03/2022   Component Date Value Ref Range Status   • WBC 08/03/2022 7.14  3.40 - 10.80 10*3/mm3 Final   • RBC 08/03/2022 4.82  4.14 - 5.80 10*6/mm3 Final   • Hemoglobin 08/03/2022 14.7  13.0 - 17.7 g/dL Final   • Hematocrit 08/03/2022 43.1  37.5 - 51.0 % Final   • MCV 08/03/2022 89.4  79.0 - 97.0 fL Final   • MCH 08/03/2022 30.5  26.6 - 33.0 pg Final   • MCHC 08/03/2022 34.1  31.5 - 35.7 g/dL Final   • RDW 08/03/2022 12.9  12.3 - 15.4 % Final    • RDW-SD 08/03/2022 41.8  37.0 - 54.0 fl Final   • MPV 08/03/2022 9.6  6.0 - 12.0 fL Final   • Platelets 08/03/2022 169  140 - 450 10*3/mm3 Final   • Neutrophil % 08/03/2022 55.3  42.7 - 76.0 % Final   • Lymphocyte % 08/03/2022 29.7  19.6 - 45.3 % Final   • Monocyte % 08/03/2022 11.9  5.0 - 12.0 % Final   • Eosinophil % 08/03/2022 2.5  0.3 - 6.2 % Final   • Basophil % 08/03/2022 0.6  0.0 - 1.5 % Final   • Neutrophils, Absolute 08/03/2022 3.95  1.70 - 7.00 10*3/mm3 Final   • Lymphocytes, Absolute 08/03/2022 2.12  0.70 - 3.10 10*3/mm3 Final   • Monocytes, Absolute 08/03/2022 0.85  0.10 - 0.90 10*3/mm3 Final   • Eosinophils, Absolute 08/03/2022 0.18  0.00 - 0.40 10*3/mm3 Final   • Basophils, Absolute 08/03/2022 0.04  0.00 - 0.20 10*3/mm3 Final         Assessment & Plan   Diagnoses and all orders for this visit:    1. Bilateral varicoceles (Primary)  -     Ambulatory Referral to Urology    He had an ultrasound ordered by another provider.  He has the above described physical complaints.  He has seen 3 urologist about this problem.  He asks me if anything else can be done.  The other thing I can do is refer him for another opinion from a urologist outside of that other group.  I am going to refer him to Dr. Mcknight here in [place] for his opinion.  Does not need to follow-up here.  He is following up with Patricia Wade and specialists to which she has referred him.  His hypogonadism is being managed and I think it is prudent for him to continue the follow-up he has been doing.  Overall, I spent 20 minutes with Emmanuel today discussing the above.        Call with any problems or concerns before next visit       Return if symptoms worsen or fail to improve.      Much of this report is an electronic transcription of spoken language to printed text using Dragon dictation software.  As such, the subtleties and finesse of spoken language may permit erroneous, or at times, nonsensical words or phrases to be inadvertently  transcribed; thus changes may be made at a later date to rectify these errors.     Yue Adamson MD1/3/[id number]:12 EST  This note has been electronically signed

## 2023-01-18 DIAGNOSIS — I25.10 CORONARY ARTERY DISEASE INVOLVING NATIVE CORONARY ARTERY OF NATIVE HEART WITHOUT ANGINA PECTORIS: ICD-10-CM

## 2023-01-18 DIAGNOSIS — E78.2 MIXED HYPERLIPIDEMIA: ICD-10-CM

## 2023-01-19 RX ORDER — CLOPIDOGREL BISULFATE 75 MG/1
TABLET ORAL
Qty: 90 TABLET | Refills: 0 | Status: SHIPPED | OUTPATIENT
Start: 2023-01-19 | End: 2023-01-20 | Stop reason: SDUPTHER

## 2023-01-19 RX ORDER — EZETIMIBE 10 MG/1
TABLET ORAL
Qty: 30 TABLET | Refills: 0 | Status: SHIPPED | OUTPATIENT
Start: 2023-01-19

## 2023-01-19 RX ORDER — SIMVASTATIN 40 MG
TABLET ORAL
Qty: 30 TABLET | Refills: 0 | Status: SHIPPED | OUTPATIENT
Start: 2023-01-19

## 2023-01-20 ENCOUNTER — TELEPHONE (OUTPATIENT)
Dept: CARDIOLOGY | Facility: CLINIC | Age: 58
End: 2023-01-20
Payer: COMMERCIAL

## 2023-01-20 RX ORDER — CLOPIDOGREL BISULFATE 75 MG/1
75 TABLET ORAL DAILY
Qty: 90 TABLET | Refills: 3 | Status: SHIPPED | OUTPATIENT
Start: 2023-01-20

## 2023-01-20 RX ORDER — CHORIONIC GONADOTROPIN 10000 UNIT
KIT INTRAMUSCULAR
Status: CANCELLED | OUTPATIENT
Start: 2023-01-20

## 2023-01-20 RX ORDER — CARVEDILOL 12.5 MG/1
12.5 TABLET ORAL EVERY MORNING
Qty: 90 TABLET | Refills: 3 | Status: SHIPPED | OUTPATIENT
Start: 2023-01-20

## 2023-01-20 RX ORDER — CARVEDILOL 25 MG/1
25 TABLET ORAL EVERY EVENING
Qty: 90 TABLET | Refills: 3 | Status: SHIPPED | OUTPATIENT
Start: 2023-01-20

## 2023-01-20 NOTE — TELEPHONE ENCOUNTER
Caller: Emmanuel Carpenter    Relationship: Self    Best call back number: [phone]    Requested Prescriptions:   Requested Prescriptions     Pending Prescriptions Disp Refills   • carvedilol (COREG) 12.5 MG tablet 90 tablet 3     Sig: Take 1 tablet by mouth Every Morning.   • carvedilol (COREG) 25 MG tablet 90 tablet 3     Sig: Take 1 tablet by mouth Every Evening.   • clopidogrel (PLAVIX) 75 MG tablet 90 tablet 0     Sig: Take 1 tablet by mouth Daily.        Pharmacy where request should be sent: [facility] [phone] [place], [place] [phone] FX     Additional details provided by patient: PT REPORTS HE WAS TOLD BY HIS PHARMACY THAT NEW PRESCRIPTIONS WERE NEEDED - PT REQUESTING 90 DAYS REFILLS     Does the patient have less than a 3 day supply:  [] Yes  [x] No    Would you like a call back once the refill request has been completed: [x] Yes [] No    If the office needs to give you a call back, can they leave a voicemail: [x] Yes [] No    Zeynep Fuller Rep   01/20/23 14:40 EST

## 2023-01-24 ENCOUNTER — TELEPHONE (OUTPATIENT)
Dept: CARDIOLOGY | Facility: CLINIC | Age: 58
End: 2023-01-24
Payer: COMMERCIAL

## 2023-01-24 DIAGNOSIS — E78.2 MIXED HYPERLIPIDEMIA: ICD-10-CM

## 2023-01-24 DIAGNOSIS — E78.2 MIXED HYPERLIPIDEMIA: Primary | ICD-10-CM

## 2023-06-06 NOTE — PROGRESS NOTES
Date of Office Visit: 2023  Encounter Provider: Dr. Timo Hamlin  Place of Service: [facility] CARDIOLOGY [place]  Patient Name: Emmanuel Carpenter  :1965  Patricia Wade APRN    Chief Complaint   Patient presents with    Atrial Fibrillation    Coronary Artery Disease    Hyperlipidemia    Hypertension    Follow-up     History of Present Illness    I am pleased to see Mr. Carpenter in my office today as a follow-up.     As you know, patient is 58 years old white gentleman whose past medical history is significant for hypertension, hyperlipidemia, CAD, CABG, who came today for follow-up.     In 2018, patient was referred to me for symptom of angina pectoris.  Patient underwent stress test which was abnormal.  Patient underwent cardiac catheterization which showed three-vessel coronary artery disease with significant left main stenosis.  Patient underwent CABG x4.  In , patient underwent VANDANA and carotid Doppler they were unremarkable.    Patient came today for follow-up.  Patient is doing exercise at cardiac rehab 2-3 times a week.  Patient does not have any symptom of chest pain or tightness or heaviness.  Mild shortness of breath noted.  No orthopnea PND no syncope or presyncope.    EKG showed normal sinus rhythm.    Patient has 5 years out from his coronary artery bypass grafting.  I would recommend to proceed with stress test with Cardiolite imaging.  I would decrease carvedilol to 12.5 mg twice daily as patient is slightly bradycardic.  I would also discontinue ramipril from 7.5 mg daily to 5 mg daily.        Past Medical History:   Diagnosis Date    Actinic keratosis     Acute kidney injury 10/03/2018    Consulted by Dr. Nunez    Aortic regurgitation 2016    Aortic stenosis 10/02/2018    L Main Noted on Cardiac Cath    Atrial fibrillation     Bronchitis     Cataract     Coronary artery disease     Detached retina     Family history of heart disease     Enlarged Hearts & Valve  Replacements    GERD (gastroesophageal reflux disease)     History of chest x-ray 10/5/18-[facility]    Stable but Small L Apical Pneumothorax    History of echocardiogram 03/23/16-[facility]    Mild Concentrive L Ventricular Hypertrophy; Moderate Aortic Reg; Mild Mitral Reg    HLD (hyperlipidemia)     Hx of chest x-ray 10/3/18-[facility]    Small L Apical Pneumothorax     Hypertension     Hypogonadism in male     Hypokalemia     Left ventricular hypertrophy by electrocardiogram     in the past by echo- mild.     Mild concentric left ventricular hypertrophy 03/23/2016    Noted on Echo    Pelvis tilted     Pneumothorax 10/05/2018    Small Noted on Chest XR    Polycythemia     Recurrent acute suppurative otitis media of right ear without spontaneous rupture of tympanic membrane 5/13/2020    SVT (supraventricular tachycardia) Hx    S/p Ablation in 2006    Testicular hypofunction 9/16/2021         Past Surgical History:   Procedure Laterality Date    APPENDECTOMY  1990    CARDIAC ABLATION  2006    CARDIAC CATHETERIZATION Left 10/2/18-[facility]    w/Arteriography/Angiography-Dr. Hamlin--100% Occulsion of RCA; Significant L Main Stenosis    CHOLECYSTECTOMY  2004    CORONARY ARTERY BYPASS GRAFT  10/03/2018    urgent X4   Dr Farris           Current Outpatient Medications:     carvedilol (COREG) 12.5 MG tablet, Take 1 tablet by mouth 2 (Two) Times a Day With Meals., Disp: 90 tablet, Rfl: 3    Chorionic Gonadotropin (HCG IJ), Inject  as directed., Disp: , Rfl:     clomiPHENE (CLOMID) 50 MG tablet, clomiphene citrate 50 mg tablet  TAKE 1/2 (ONE-HALF) TABLET BY MOUTH ONCE DAILY, Disp: , Rfl:     clopidogrel (PLAVIX) 75 MG tablet, Take 1 tablet by mouth Daily., Disp: 90 tablet, Rfl: 3    ezetimibe (ZETIA) 10 MG tablet, Take 1 tablet by mouth once daily, Disp: 30 tablet, Rfl: 0    famotidine (PEPCID) 40 MG tablet, TAKE 1 TABLET BY MOUTH TWICE DAILY, Disp: 180 tablet, Rfl: 3    ramipril (ALTACE) 5 MG capsule, ramipril 5 mg capsule  TAKE 1 CAPSULE BY MOUTH  "EVERY DAY, Disp: , Rfl:     simvastatin (ZOCOR) 40 MG tablet, TAKE 1 TABLET BY MOUTH ONCE DAILY AT NIGHT, Disp: 30 tablet, Rfl: 0      Social History     Socioeconomic History    Marital status:      Spouse name: Fely   Tobacco Use    Smoking status: Never     Passive exposure: Never    Smokeless tobacco: Never   Vaping Use    Vaping Use: Never used   Substance and Sexual Activity    Alcohol use: No    Drug use: No    Sexual activity: Defer         Review of Systems   Constitutional: Negative for chills and fever.   HENT:  Negative for ear discharge and nosebleeds.    Eyes:  Negative for discharge and redness.   Cardiovascular:  Negative for chest pain, orthopnea, palpitations, paroxysmal nocturnal dyspnea and syncope.   Respiratory:  Negative for cough, shortness of breath and wheezing.    Endocrine: Negative for heat intolerance.   Skin:  Negative for rash.   Musculoskeletal:  Negative for arthritis and myalgias.   Gastrointestinal:  Negative for abdominal pain, melena, nausea and vomiting.   Genitourinary:  Negative for dysuria and hematuria.   Neurological:  Negative for dizziness, light-headedness, numbness and tremors.   Psychiatric/Behavioral:  Negative for depression. The patient is not nervous/anxious.      Procedures      ECG 12 Lead    Date/Time: 6/8/2023 1:52 PM  Performed by: Timo Hamlin MD  Authorized by: Timo Hamlin MD   Comparison: compared with previous ECG   Similar to previous ECG  Rhythm: sinus rhythm    Clinical impression: normal ECG        ECG 12 Lead    (Results Pending)           Objective:    /75 (BP Location: Right arm, Patient Position: Sitting, Cuff Size: Large Adult)   Pulse 58   Ht 182.9 cm (72.01\")   Wt 79.4 kg (175 lb)   SpO2 96%   BMI 23.73 kg/m²         Constitutional:       Appearance: Well-developed.   Eyes:      General: No scleral icterus.        Right eye: No discharge.   HENT:      Head: Normocephalic and atraumatic.   Neck:      Thyroid: No " thyromegaly.      Lymphadenopathy: No cervical adenopathy.   Pulmonary:      Effort: Pulmonary effort is normal. No respiratory distress.      Breath sounds: Normal breath sounds. No wheezing. No rales.   Cardiovascular:      Normal rate. Regular rhythm.      No gallop.    Edema:     Peripheral edema absent.   Abdominal:      Tenderness: There is no abdominal tenderness.   Skin:     Findings: No erythema or rash.   Neurological:      Mental Status: Alert and oriented to person, place, and time.           Assessment:       Diagnosis Plan   1. Coronary artery disease involving native coronary artery of native heart without angina pectoris  ECG 12 Lead    Stress Test With Myocardial Perfusion One Day    carvedilol (COREG) 12.5 MG tablet      2. Paroxysmal atrial fibrillation  ECG 12 Lead    Stress Test With Myocardial Perfusion One Day    carvedilol (COREG) 12.5 MG tablet      3. Essential hypertension  ECG 12 Lead    Stress Test With Myocardial Perfusion One Day    carvedilol (COREG) 12.5 MG tablet      4. Mixed hyperlipidemia  ECG 12 Lead    Stress Test With Myocardial Perfusion One Day    carvedilol (COREG) 12.5 MG tablet      5. Shortness of breath  Stress Test With Myocardial Perfusion One Day    carvedilol (COREG) 12.5 MG tablet               Plan:       MDM:    1.  CAD:    Patient has CABG x4 in 2018.  Patient has not had any ischemic evaluation.  I would recommend to proceed with stress test with Cardiolite imaging    2.  Hypertension:    Blood pressure is controlled.  I will recommend to decrease ramipril dose from 7.5 mg to 5 mg daily    3.  Sinus bradycardia:    Decrease carvedilol to 12.5 mg twice daily    4.  Hyperlipidemia:    Patient is on simvastatin repeat lipid panel testing

## 2023-06-08 ENCOUNTER — OFFICE VISIT (OUTPATIENT)
Dept: CARDIOLOGY | Facility: CLINIC | Age: 58
End: 2023-06-08
Payer: COMMERCIAL

## 2023-06-08 VITALS
DIASTOLIC BLOOD PRESSURE: 75 MMHG | WEIGHT: 175 LBS | HEART RATE: 58 BPM | HEIGHT: 72 IN | BODY MASS INDEX: 23.7 KG/M2 | OXYGEN SATURATION: 96 % | SYSTOLIC BLOOD PRESSURE: 116 MMHG

## 2023-06-08 DIAGNOSIS — E78.2 MIXED HYPERLIPIDEMIA: ICD-10-CM

## 2023-06-08 DIAGNOSIS — R06.02 SHORTNESS OF BREATH: ICD-10-CM

## 2023-06-08 DIAGNOSIS — I25.10 CORONARY ARTERY DISEASE INVOLVING NATIVE CORONARY ARTERY OF NATIVE HEART WITHOUT ANGINA PECTORIS: Primary | ICD-10-CM

## 2023-06-08 DIAGNOSIS — I48.0 PAROXYSMAL ATRIAL FIBRILLATION: ICD-10-CM

## 2023-06-08 DIAGNOSIS — I10 ESSENTIAL HYPERTENSION: ICD-10-CM

## 2023-06-08 PROCEDURE — 93000 ELECTROCARDIOGRAM COMPLETE: CPT | Performed by: INTERNAL MEDICINE

## 2023-06-08 PROCEDURE — 99214 OFFICE O/P EST MOD 30 MIN: CPT | Performed by: INTERNAL MEDICINE

## 2023-06-08 RX ORDER — CARVEDILOL 12.5 MG/1
12.5 TABLET ORAL 2 TIMES DAILY WITH MEALS
Qty: 90 TABLET | Refills: 3 | Status: SHIPPED | OUTPATIENT
Start: 2023-06-08

## 2023-06-16 ENCOUNTER — HOSPITAL ENCOUNTER (OUTPATIENT)
Dept: NUCLEAR MEDICINE | Facility: HOSPITAL | Age: 58
Discharge: HOME | End: 2023-06-16
Payer: COMMERCIAL

## 2023-06-16 DIAGNOSIS — I10 ESSENTIAL HYPERTENSION: ICD-10-CM

## 2023-06-16 DIAGNOSIS — R06.02 SHORTNESS OF BREATH: ICD-10-CM

## 2023-06-16 DIAGNOSIS — E78.2 MIXED HYPERLIPIDEMIA: ICD-10-CM

## 2023-06-16 DIAGNOSIS — I25.10 CORONARY ARTERY DISEASE INVOLVING NATIVE CORONARY ARTERY OF NATIVE HEART WITHOUT ANGINA PECTORIS: ICD-10-CM

## 2023-06-16 DIAGNOSIS — I48.0 PAROXYSMAL ATRIAL FIBRILLATION: ICD-10-CM

## 2023-06-16 PROCEDURE — A9502 TC99M TETROFOSMIN: HCPCS | Performed by: INTERNAL MEDICINE

## 2023-06-16 PROCEDURE — 93017 CV STRESS TEST TRACING ONLY: CPT

## 2023-06-16 PROCEDURE — 0 TECHNETIUM TETROFOSMIN KIT: Performed by: INTERNAL MEDICINE

## 2023-06-16 PROCEDURE — 78452 HT MUSCLE IMAGE SPECT MULT: CPT

## 2023-06-16 RX ADMIN — TETROFOSMIN 1 DOSE: 1.38 INJECTION, POWDER, LYOPHILIZED, FOR SOLUTION INTRAVENOUS at 09:30

## 2023-06-16 RX ADMIN — TETROFOSMIN 1 DOSE: 1.38 INJECTION, POWDER, LYOPHILIZED, FOR SOLUTION INTRAVENOUS at 08:15

## 2023-06-17 LAB
BH CV NUCLEAR PRIOR STUDY: 3
BH CV REST NUCLEAR ISOTOPE DOSE: 10.4 MCI
BH CV STRESS BP STAGE 1: NORMAL
BH CV STRESS BP STAGE 2: NORMAL
BH CV STRESS BP STAGE 3: NORMAL
BH CV STRESS DURATION MIN STAGE 1: 3
BH CV STRESS DURATION MIN STAGE 2: 3
BH CV STRESS DURATION MIN STAGE 3: 3
BH CV STRESS DURATION SEC STAGE 1: 0
BH CV STRESS DURATION SEC STAGE 2: 0
BH CV STRESS DURATION SEC STAGE 3: 0
BH CV STRESS GRADE STAGE 1: 10
BH CV STRESS GRADE STAGE 2: 12
BH CV STRESS GRADE STAGE 3: 14
BH CV STRESS HR STAGE 1: 82
BH CV STRESS HR STAGE 2: 107
BH CV STRESS HR STAGE 3: 132
BH CV STRESS METS STAGE 1: 5
BH CV STRESS METS STAGE 2: 7.5
BH CV STRESS METS STAGE 3: 10
BH CV STRESS NUCLEAR ISOTOPE DOSE: 30.2 MCI
BH CV STRESS PROTOCOL 1: NORMAL
BH CV STRESS RECOVERY BP: NORMAL MMHG
BH CV STRESS RECOVERY HR: 99 BPM
BH CV STRESS SPEED STAGE 1: 1.7
BH CV STRESS SPEED STAGE 2: 2.5
BH CV STRESS SPEED STAGE 3: 3.4
BH CV STRESS STAGE 1: 1
BH CV STRESS STAGE 2: 2
BH CV STRESS STAGE 3: 3
LV EF NUC BP: 64 %
MAXIMAL PREDICTED HEART RATE: 164 BPM
PERCENT MAX PREDICTED HR: 87.8 %
STRESS BASELINE BP: NORMAL MMHG
STRESS BASELINE HR: 65 BPM
STRESS PERCENT HR: 103 %
STRESS POST ESTIMATED WORKLOAD: 12.8 METS
STRESS POST EXERCISE DUR MIN: 10 MIN
STRESS POST PEAK BP: NORMAL MMHG
STRESS POST PEAK HR: 144 BPM
STRESS TARGET HR: 139 BPM

## 2023-07-25 ENCOUNTER — TELEPHONE (OUTPATIENT)
Dept: CARDIOLOGY | Facility: CLINIC | Age: 58
End: 2023-07-25
Payer: COMMERCIAL

## 2023-07-25 DIAGNOSIS — I10 ESSENTIAL HYPERTENSION: ICD-10-CM

## 2023-07-25 DIAGNOSIS — I48.0 PAROXYSMAL ATRIAL FIBRILLATION: ICD-10-CM

## 2023-07-25 DIAGNOSIS — R06.02 SHORTNESS OF BREATH: ICD-10-CM

## 2023-07-25 DIAGNOSIS — I25.10 CORONARY ARTERY DISEASE INVOLVING NATIVE CORONARY ARTERY OF NATIVE HEART WITHOUT ANGINA PECTORIS: ICD-10-CM

## 2023-07-25 DIAGNOSIS — E78.2 MIXED HYPERLIPIDEMIA: ICD-10-CM

## 2023-07-25 RX ORDER — CARVEDILOL 12.5 MG/1
12.5 TABLET ORAL 2 TIMES DAILY WITH MEALS
Qty: 180 TABLET | Refills: 3 | Status: SHIPPED | OUTPATIENT
Start: 2023-07-25

## 2023-07-25 NOTE — TELEPHONE ENCOUNTER
Caller: Emmanuel Carpenter    Relationship: Self    Best call back number: [phone]    Requested Prescriptions:   Requested Prescriptions     Pending Prescriptions Disp Refills    carvedilol (COREG) 12.5 MG tablet 90 tablet 3     Sig: Take 1 tablet by mouth 2 (Two) Times a Day With Meals.        Pharmacy where request should be sent: [place] [phone] [place] [phone]      Last office visit with prescribing clinician: 6/8/2023   Last telemedicine visit with prescribing clinician: Visit date not found   Next office visit with prescribing clinician: 6/12/2024     Additional details provided by patient: PATIENT REQUESTING 180 PILLS FOR A 90 DAY SUPPLY     Does the patient have less than a 3 day supply:  [] Yes  [x] No    Would you like a call back once the refill request has been completed: [] Yes [x] No    If the office needs to give you a call back, can they leave a voicemail: [] Yes [x] No    Zeynep Mauricio Rep   07/25/23 09:10 EDT

## 2023-10-27 ENCOUNTER — TELEPHONE (OUTPATIENT)
Dept: CARDIOLOGY | Facility: CLINIC | Age: 58
End: 2023-10-27

## 2023-10-27 NOTE — TELEPHONE ENCOUNTER
Caller: Emmanuel Carpenter    Relationship: Self    Best call back number: [phone]    What form or medical record are you requesting: LETTER OF CONSENT TO TREAT PATIENT    Who is requesting this form or medical record from you: [facility] [place] [facility]    How would you like to receive the form or medical records (pick-up, mail, fax): FAX  If fax, what is the fax number:[phone]   If mail, what is the address: [address] [address]  If pick-up, provide patient with address and location details    Timeframe paperwork needed: AS SOON AS POSSIBLE    Additional notes: PATIENT STATED THAT HE SUFFERS FROM TESTOSTERONE AND IS SEEKING CARE FROM [facility] AND THEY REQUIRE A LETTER OF CONSENT TO TREAT THE PATIENT FROM DR. RICO. PLEASE CONTACT PATIENT ONCE THIS LETTER HAS BEEN FAXED. THANK YOU.

## 2023-11-03 ENCOUNTER — TELEPHONE (OUTPATIENT)
Dept: CARDIOLOGY | Facility: CLINIC | Age: 58
End: 2023-11-03

## 2023-11-03 NOTE — TELEPHONE ENCOUNTER
Caller: Emmanuel Carpenter    Relationship: Self    Best call back number: [phone]    What is the best time to reach you: ANYTIME    Who are you requesting to speak with (clinical staff, provider,  specific staff member): CLINICAL    What was the call regarding: PT IS CALLING TO SEE IF HE CAN HAVE SOMEONE PRESCRIBE HIM TADALAFIL, VIAGRA MEDICATION, THAT HE CAN TAKE EVERYDAY. HE WANTED TO SEE IF DR. RICO COULD PRESCRIBE IT OR IF ONE OF THE APRN'S COULD PRESCRIBE IT.    Is it okay if the provider responds through MyChart: NO

## 2023-11-30 ENCOUNTER — TELEPHONE (OUTPATIENT)
Dept: CARDIOLOGY | Facility: CLINIC | Age: 58
End: 2023-11-30

## 2023-11-30 NOTE — TELEPHONE ENCOUNTER
Caller: Emmanuel Carpenter call back number: [phone]     What is your medical concern? PT WANTING TO INQUIRE INTO WHETHER OFFICE WOULD BE ABLE TO MONITOR HIS TESTOSTERONE LEVELS AND SEE ABOUT PRESCRIBING AN ERECTILE DYSFUNCTION MEDICATION FOR YA - PT INQUIRING IF APPT IS REQUIRED AS HE WOULD LIKE TO DISCUSS MEDICATION OPTIONS HE IS COMFORTABLE WITH

## 2024-01-16 ENCOUNTER — PATIENT ROUNDING (BHMG ONLY) (OUTPATIENT)
Dept: FAMILY MEDICINE CLINIC | Facility: CLINIC | Age: 59
End: 2024-01-16
Payer: COMMERCIAL

## 2024-01-16 ENCOUNTER — OFFICE VISIT (OUTPATIENT)
Dept: FAMILY MEDICINE CLINIC | Facility: CLINIC | Age: 59
End: 2024-01-16
Payer: COMMERCIAL

## 2024-01-16 VITALS
DIASTOLIC BLOOD PRESSURE: 83 MMHG | HEIGHT: 72 IN | HEART RATE: 59 BPM | TEMPERATURE: 97.4 F | WEIGHT: 180.8 LBS | OXYGEN SATURATION: 100 % | RESPIRATION RATE: 18 BRPM | BODY MASS INDEX: 24.49 KG/M2 | SYSTOLIC BLOOD PRESSURE: 141 MMHG

## 2024-01-16 DIAGNOSIS — I48.0 PAROXYSMAL ATRIAL FIBRILLATION: ICD-10-CM

## 2024-01-16 DIAGNOSIS — D22.9 NEVUS: ICD-10-CM

## 2024-01-16 DIAGNOSIS — N52.9 ERECTILE DYSFUNCTION, UNSPECIFIED ERECTILE DYSFUNCTION TYPE: ICD-10-CM

## 2024-01-16 DIAGNOSIS — L98.8 AGE-RELATED FACIAL WRINKLES: ICD-10-CM

## 2024-01-16 DIAGNOSIS — I10 ESSENTIAL HYPERTENSION: ICD-10-CM

## 2024-01-16 DIAGNOSIS — R06.02 SHORTNESS OF BREATH: ICD-10-CM

## 2024-01-16 DIAGNOSIS — E29.1 HYPOGONADISM IN MALE: Primary | ICD-10-CM

## 2024-01-16 DIAGNOSIS — I25.10 CORONARY ARTERY DISEASE INVOLVING NATIVE CORONARY ARTERY OF NATIVE HEART WITHOUT ANGINA PECTORIS: ICD-10-CM

## 2024-01-16 DIAGNOSIS — S86.912A MUSCLE STRAIN OF LEFT LOWER LEG, INITIAL ENCOUNTER: ICD-10-CM

## 2024-01-16 DIAGNOSIS — E78.2 MIXED HYPERLIPIDEMIA: ICD-10-CM

## 2024-01-16 RX ORDER — TADALAFIL 20 MG/1
20 TABLET ORAL
Qty: 30 TABLET | Refills: 0 | Status: SHIPPED | OUTPATIENT
Start: 2024-01-16

## 2024-01-16 RX ORDER — RAMIPRIL 5 MG/1
5 CAPSULE ORAL NIGHTLY
Qty: 90 CAPSULE | Refills: 1 | Status: SHIPPED | OUTPATIENT
Start: 2024-01-16

## 2024-01-16 RX ORDER — CLOPIDOGREL BISULFATE 75 MG/1
75 TABLET ORAL DAILY
Qty: 90 TABLET | Refills: 3 | Status: SHIPPED | OUTPATIENT
Start: 2024-01-16

## 2024-01-16 RX ORDER — RAMIPRIL 2.5 MG/1
2.5 CAPSULE ORAL EVERY MORNING
Qty: 90 CAPSULE | Refills: 1 | Status: SHIPPED | OUTPATIENT
Start: 2024-01-16

## 2024-01-16 RX ORDER — SIMVASTATIN 20 MG
20 TABLET ORAL EVERY OTHER DAY
Qty: 90 TABLET | Refills: 1 | Status: SHIPPED | OUTPATIENT
Start: 2024-01-16

## 2024-01-16 RX ORDER — FAMOTIDINE 40 MG/1
40 TABLET, FILM COATED ORAL 2 TIMES DAILY
Qty: 180 TABLET | Refills: 3 | Status: SHIPPED | OUTPATIENT
Start: 2024-01-16

## 2024-01-16 RX ORDER — TESTOSTERONE 10 MG/G
2 GEL TOPICAL EVERY MORNING
Qty: 150 G | Refills: 2 | Status: SHIPPED | OUTPATIENT
Start: 2024-01-16

## 2024-01-16 RX ORDER — EZETIMIBE 10 MG/1
10 TABLET ORAL DAILY
Qty: 90 TABLET | Refills: 1 | Status: SHIPPED | OUTPATIENT
Start: 2024-01-16

## 2024-01-16 RX ORDER — RAMIPRIL 2.5 MG/1
2.5 CAPSULE ORAL DAILY
COMMUNITY
Start: 2023-10-26 | End: 2024-01-16 | Stop reason: SDUPTHER

## 2024-01-16 RX ORDER — CARVEDILOL 12.5 MG/1
12.5 TABLET ORAL 2 TIMES DAILY WITH MEALS
Qty: 180 TABLET | Refills: 3 | Status: SHIPPED | OUTPATIENT
Start: 2024-01-16

## 2024-01-16 NOTE — PROGRESS NOTES
Chief Complaint  Chief Complaint   Patient presents with    Establish Care     Discuss Medication changes, concerned simvastatin's causing arthritis , and others have decreased his libido , was previously on testosterone .           Subjective          Emmanuel Carpenter presents to [facility] for   History of Present Illness    58-year-old male patient presents to establish care with new provider.  His previous PCP was Patricia Wade.  He has a past medical history of hypogonadism, coronary artery disease, atrial fibrillation, hypertension, hyperlipidemia. He follows with cardiology.     He went to [facility] in [place], has labs available for review. Patricia Wade prescribed hgc and clomid, which he is taking currently. He has a history of low T, 12/5/23, T level per [facility] labs 348, reports fatigue and ED. States he had to have phlebotomy when on T 140 weekly. Has never tried a topical.     Afib, post op from cabg only, no recent issues, he is on plavix     CAD, Hyperlipidemia, hx of CABG, he is taking beta blocker, stopped statin d/t toe pain, he restarted 20mg simva every other day and tolerating well. The patient denies muscle aches, constipation, diarrhea, GI upset, fatigue, chest pain/pressure, exercise intolerance, dyspnea, palpitations, syncope and pedal edema.      HTN, running 118//64 during exercise, feels stable on meds except has c/o ED. Takes meds as directed, denies chest pain, headache, shortness of air, palpitations and swelling of extremities.     He c/o left inguinal/quad strain, tight, he f/w [facility] and doing cardiac rehab    The following portions of the patient's history were reviewed and updated as appropriate: allergies, current medications, past family history, past medical history, past social history, past surgical history and problem list.    Past Medical History:   Diagnosis Date    Actinic keratosis     Acute kidney injury  10/03/2018    Aortic regurgitation 2016    Aortic stenosis 10/02/2018    Atrial fibrillation     Bronchitis     Cataract     Coronary artery disease     Detached retina     Family history of heart disease     GERD (gastroesophageal reflux disease)     History of chest x-ray 10/5/18-[facility]    History of echocardiogram 03/23/16-[facility]    HLD (hyperlipidemia)     Hx of chest x-ray 10/3/18-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    w/Arteriography/Angiography-Dr. Hamlin--100% Occulsion of RCA; Significant L Main Stenosis    CHOLECYSTECTOMY  2004    CORONARY ARTERY BYPASS GRAFT  10/03/2018    urgent X4   Dr Farris     Family History   Problem Relation Age of Onset    Heart defect Maternal Grandmother         Enlarged Heart    Atrial fibrillation Maternal Grandmother     Diabetes Maternal Grandmother     Hypertension Mother     Melanoma Father     Diabetes Father     Heart disease Maternal Uncle     Diabetes Paternal Grandmother      Social History     Tobacco Use    Smoking status: Never     Passive exposure: Never    Smokeless tobacco: Never   Substance Use Topics    Alcohol use: No       Current Outpatient Medications:     carvedilol (COREG) 12.5 MG tablet, Take 1 tablet by mouth 2 (Two) Times a Day With Meals., Disp: 180 tablet, Rfl: 3    clopidogrel (PLAVIX) 75 MG tablet, Take 1 tablet by mouth Daily., Disp: 90 tablet, Rfl: 3    ezetimibe (ZETIA) 10 MG tablet, Take 1 tablet by mouth Daily., Disp:  "90 tablet, Rfl: 1    famotidine (PEPCID) 40 MG tablet, Take 1 tablet by mouth 2 (Two) Times a Day., Disp: 180 tablet, Rfl: 3    ramipril (ALTACE) 2.5 MG capsule, Take 1 capsule by mouth Every Morning., Disp: 90 capsule, Rfl: 1    ramipril (ALTACE) 5 MG capsule, Take 1 capsule by mouth Every Night., Disp: 90 capsule, Rfl: 1    simvastatin (ZOCOR) 20 MG tablet, Take 1 tablet by mouth Every Other Day., Disp: 90 tablet, Rfl: 1    tadalafil (CIALIS) 20 MG tablet, Take 1 tablet by mouth Every 72 (Seventy-Two) Hours As Needed for Erectile Dysfunction., Disp: 30 tablet, Rfl: 0    testosterone 12.5 MG/ACT (1%) gel, Place 2 Pump on the skin as directed by provider Every Morning., Disp: 150 g, Rfl: 2    Tretinoin, Facial Wrinkles, 0.05 % cream, Apply pea size amt to face daily, Disp: 20 g, Rfl: 2    Objective   Vital Signs:   /83 (BP Location: Right arm, Patient Position: Sitting, Cuff Size: Adult)   Pulse 59   Temp 97.4 °F (36.3 °C) (Oral)   Resp 18   Ht 182.9 cm (72\")   Wt 82 kg (180 lb 12.8 oz)   SpO2 100%   BMI 24.52 kg/m²           Physical Exam  Constitutional:       General: He is not in acute distress.     Appearance: Normal appearance. He is well-developed. He is not ill-appearing or diaphoretic.   HENT:      Head: Normocephalic.   Eyes:      Conjunctiva/sclera: Conjunctivae normal.      Pupils: Pupils are equal, round, and reactive to light.   Neck:      Thyroid: No thyromegaly.      Vascular: No JVD.   Cardiovascular:      Rate and Rhythm: Normal rate and regular rhythm.      Heart sounds: Normal heart sounds. No murmur heard.  Pulmonary:      Effort: Pulmonary effort is normal. No respiratory distress.      Breath sounds: Normal breath sounds. No wheezing or rhonchi.   Abdominal:      General: Bowel sounds are normal. There is no distension.      Palpations: Abdomen is soft.      Tenderness: There is no abdominal tenderness.   Musculoskeletal:         General: No swelling or tenderness. Normal range of " motion.      Cervical back: Normal range of motion and neck supple. No tenderness.   Lymphadenopathy:      Cervical: No cervical adenopathy.   Skin:     General: Skin is warm and dry.      Coloration: Skin is not jaundiced.      Findings: No erythema or rash.   Neurological:      General: No focal deficit present.      Mental Status: He is alert and oriented to person, place, and time. Mental status is at baseline.      Sensory: No sensory deficit.   Psychiatric:         Mood and Affect: Mood normal.         Behavior: Behavior normal.         Thought Content: Thought content normal.         Judgment: Judgment normal.          Result Review :     No visits with results within 7 Day(s) from this visit.   Latest known visit with results is:   Hospital Outpatient Visit on 06/16/2023   Component Date Value Ref Range Status    BH CV STRESS PROTOCOL 1 06/16/2023 Gee   Final    Stage 1 06/16/2023 1.0   Final    HR Stage 1 06/16/2023 82   Final    BP Stage 1 06/16/2023 140/78   Final    Duration Min Stage 1 06/16/2023 3   Final    Duration Sec Stage 1 06/16/2023 0   Final    Grade Stage 1 06/16/2023 10   Final    Speed Stage 1 06/16/2023 1.7   Final    BH CV STRESS METS STAGE 1 06/16/2023 5.0   Final    Stage 2 06/16/2023 2.0   Final    HR Stage 2 06/16/2023 107   Final    BP Stage 2 06/16/2023 156/84   Final    Duration Min Stage 2 06/16/2023 3   Final    Duration Sec Stage 2 06/16/2023 0   Final    Grade Stage 2 06/16/2023 12   Final    Speed Stage 2 06/16/2023 2.5   Final    BH CV STRESS METS STAGE 2 06/16/2023 7.5   Final    Stage 3 06/16/2023 3.0   Final    HR Stage 3 06/16/2023 132   Final    BP Stage 3 06/16/2023 174/82   Final    Duration Min Stage 3 06/16/2023 3   Final    Duration Sec Stage 3 06/16/2023 0   Final    Grade Stage 3 06/16/2023 14   Final    Speed Stage 3 06/16/2023 3.4   Final    BH CV STRESS METS STAGE 3 06/16/2023 10.0   Final    Baseline HR 06/16/2023 65  bpm Final    Baseline BP 06/16/2023  146/88  mmHg Final    Peak HR 06/16/2023 144  bpm Final    Peak BP 06/16/2023 174/82  mmHg Final    Recovery HR 06/16/2023 99  bpm Final    Recovery BP 06/16/2023 142/80  mmHg Final    Target HR (85%) 06/16/2023 139  bpm Final    Max. Pred. HR (100%) 06/16/2023 164  bpm Final    Percent Max Pred HR 06/16/2023 87.80  % Final    Percent Target HR 06/16/2023 103  % Final    Nuclear Prior Study 06/16/2023 3.0   Final    BH CV REST NUCLEAR ISOTOPE DOSE 06/16/2023 10.4  mCi Final    BH CV STRESS NUCLEAR ISOTOPE DOSE 06/16/2023 30.2  mCi Final    Nuc Stress EF 06/16/2023 64  % Final    Exercise duration (min) 06/16/2023 10  min Final    Estimated workload 06/16/2023 12.8  METS Final                  BMI is within normal parameters. No other follow-up for BMI required.           Assessment and Plan    Diagnoses and all orders for this visit:    1. Hypogonadism in male (Primary)  -     testosterone 12.5 MG/ACT (1%) gel; Place 2 Pump on the skin as directed by provider Every Morning.  Dispense: 150 g; Refill: 2  -     Testosterone; Future    2. Erectile dysfunction, unspecified erectile dysfunction type  -     tadalafil (CIALIS) 20 MG tablet; Take 1 tablet by mouth Every 72 (Seventy-Two) Hours As Needed for Erectile Dysfunction.  Dispense: 30 tablet; Refill: 0    3. Coronary artery disease involving native coronary artery of native heart without angina pectoris  -     carvedilol (COREG) 12.5 MG tablet; Take 1 tablet by mouth 2 (Two) Times a Day With Meals.  Dispense: 180 tablet; Refill: 3  -     simvastatin (ZOCOR) 20 MG tablet; Take 1 tablet by mouth Every Other Day.  Dispense: 90 tablet; Refill: 1    4. Essential hypertension  -     carvedilol (COREG) 12.5 MG tablet; Take 1 tablet by mouth 2 (Two) Times a Day With Meals.  Dispense: 180 tablet; Refill: 3    5. Nevus  -     Ambulatory Referral to Dermatology    6. Age-related facial wrinkles  -     Tretinoin, Facial Wrinkles, 0.05 % cream; Apply pea size amt to face daily   Dispense: 20 g; Refill: 2    7. Muscle strain of left lower leg, initial encounter    8. Mixed hyperlipidemia  -     carvedilol (COREG) 12.5 MG tablet; Take 1 tablet by mouth 2 (Two) Times a Day With Meals.  Dispense: 180 tablet; Refill: 3  -     ezetimibe (ZETIA) 10 MG tablet; Take 1 tablet by mouth Daily.  Dispense: 90 tablet; Refill: 1  -     simvastatin (ZOCOR) 20 MG tablet; Take 1 tablet by mouth Every Other Day.  Dispense: 90 tablet; Refill: 1    9. Shortness of breath  -     carvedilol (COREG) 12.5 MG tablet; Take 1 tablet by mouth 2 (Two) Times a Day With Meals.  Dispense: 180 tablet; Refill: 3    10. Paroxysmal atrial fibrillation  -     carvedilol (COREG) 12.5 MG tablet; Take 1 tablet by mouth 2 (Two) Times a Day With Meals.  Dispense: 180 tablet; Refill: 3    Other orders  -     clopidogrel (PLAVIX) 75 MG tablet; Take 1 tablet by mouth Daily.  Dispense: 90 tablet; Refill: 3  -     famotidine (PEPCID) 40 MG tablet; Take 1 tablet by mouth 2 (Two) Times a Day.  Dispense: 180 tablet; Refill: 3  -     ramipril (ALTACE) 2.5 MG capsule; Take 1 capsule by mouth Every Morning.  Dispense: 90 capsule; Refill: 1  -     ramipril (ALTACE) 5 MG capsule; Take 1 capsule by mouth Every Night.  Dispense: 90 capsule; Refill: 1    Stop clomid and hcg  Will try T gel daily, low dose, recheck T in 6 wks.   Cont all other meds as directed  F/u with cardiology as directed  Rec stretching for L quad strain  Cont cardiac rehab  Labs from [facility], will scan to chart  Age appropriate preventative counseling provided, including healthy lifestyle modifications and exercise  Ok to try cialis prn for ED      I spent 30 minutes caring for Emmanuel Carpenter on this date of service. This time includes time spent by me in the following activities: preparing for the visit, reviewing tests, performing a medically appropriate examination and/or evaluation , counseling and educating the patient/family/caregiver, ordering medications,  tests, or procedures and documenting information in the medical record        Follow Up     Return in about 6 months (around 7/16/2024) for Recheck. total testosterone in 6 weeks .  Patient was given instructions and counseling regarding his condition or for health maintenance advice. Please see specific information pulled into the AVS if appropriate.        Part of this note may be an electronic transcription/translation of spoken language to printed text using the Dragon Dictation System

## 2024-01-22 ENCOUNTER — TELEPHONE (OUTPATIENT)
Dept: FAMILY MEDICINE CLINIC | Facility: CLINIC | Age: 59
End: 2024-01-22
Payer: COMMERCIAL

## 2024-01-22 NOTE — TELEPHONE ENCOUNTER
Emmanuel called stating that he experience side effects after taking Cialis this weekend. He took 1/2 tablet on Sat and Sun. He reports that during Cardiac rehab today, he experienced a near syncope episode post workout. BP pre workout was 112/69 P 62,  During exercise 154/84  P 116 and post workout 77/55  P 52. He does not want to continue taking this medication. Please advise    PA for Testosterone therapy- repeat denial. Emmanuel is going to bring us previous testosterone results to help demonstrate hypogonadism.

## 2024-01-25 ENCOUNTER — TELEPHONE (OUTPATIENT)
Dept: FAMILY MEDICINE CLINIC | Facility: CLINIC | Age: 59
End: 2024-01-25

## 2024-01-25 NOTE — TELEPHONE ENCOUNTER
PT Requesting Tadalafil changed to 5mg  Tesosterone gel 12.5mg 1% not covered by insurance but testim cream is.

## 2024-01-25 NOTE — TELEPHONE ENCOUNTER
Caller: Pauline Emmanuel    Relationship: Self    Best call back number: [phone]     What is the best time to reach you: ANY    Who are you requesting to speak with (clinical staff, provider,  specific staff member): PCP    Do you know the name of the person who called:     What was the call regarding: PATIENT WAS PRESCRIBED testosterone 12.5 MG/ACT (1%) gel  BUT HIS INSURANCE WON'T COVER IT. THEY WILL COVER TESTIM CREAM. ALSO PATIENT IS ASKING FOR THE tadalafil (CIALIS) 20 MG tablet  DOSAGE TO BE CHANGED TO ONE 5 MG TABLET DAILY. HE HAD A FEW BLOOD PRESSURE ISSUES THE DAY AFTER HE TOOK THE tadalafil (CIALIS) 20 MG tablet.    Is it okay if the provider responds through MyChart:

## 2024-01-26 DIAGNOSIS — E29.1 HYPOGONADISM IN MALE: Primary | ICD-10-CM

## 2024-01-26 RX ORDER — TADALAFIL 5 MG/1
5 TABLET ORAL
Qty: 30 TABLET | Refills: 0 | Status: SHIPPED | OUTPATIENT
Start: 2024-01-26

## 2024-01-26 RX ORDER — TESTOSTERONE GEL, 1% 10 MG/G
50 GEL TRANSDERMAL DAILY
Qty: 5 G | Refills: 2 | Status: SHIPPED | OUTPATIENT
Start: 2024-01-26

## 2024-01-30 ENCOUNTER — TELEPHONE (OUTPATIENT)
Dept: FAMILY MEDICINE CLINIC | Facility: CLINIC | Age: 59
End: 2024-01-30
Payer: COMMERCIAL

## 2024-01-30 NOTE — TELEPHONE ENCOUNTER
Caller: MANPREET DUARTE PA DEPT    Best call back number: [phone]     Patient is needing:     PATIENTS INSURANCE WILL NOT COVER GENERIC VERSION OF:    testosterone (Testim) 50 MG/5GM (1%) gel gel       BUT IT DOES COVER THE BRAND NAME BUT IT WILL REQUIRE A PA.     A PA WAS STARTED AND CAN BE COMPLETED ON COVER MY MED.     KEY CODE: [id number]

## 2024-02-02 DIAGNOSIS — E29.1 HYPOGONADISM IN MALE: ICD-10-CM

## 2024-02-02 RX ORDER — TESTOSTERONE 50 MG/5G
50 GEL TRANSDERMAL DAILY
Qty: 5 G | Refills: 2 | Status: SHIPPED | OUTPATIENT
Start: 2024-02-02

## 2024-02-05 ENCOUNTER — TELEPHONE (OUTPATIENT)
Dept: FAMILY MEDICINE CLINIC | Facility: CLINIC | Age: 59
End: 2024-02-05
Payer: COMMERCIAL

## 2024-02-05 NOTE — TELEPHONE ENCOUNTER
Per discussion yesterday w/pt's spouse, drop off printed instructions for him in pt room.    Several PA attempts have been made , most recent key:[id number] resent today. Waiting for determination.

## 2024-02-05 NOTE — TELEPHONE ENCOUNTER
PA denied due to needing 2 testosterone levels less than 300ng/mL. Emmanuel notified. He will look through records and bring in two results. Will resubmit at that time.

## 2024-02-13 ENCOUNTER — TELEPHONE (OUTPATIENT)
Dept: FAMILY MEDICINE CLINIC | Facility: CLINIC | Age: 59
End: 2024-02-13

## 2024-02-13 NOTE — TELEPHONE ENCOUNTER
Caller: Emmanuel Carpenter    Relationship: Self    Best call back number: [phone] [facility]    What was the call regarding: RETURNING CALL TO OFFICE.    PATIENT WAS ABLE TO   Testim 50 MG/5GM (1%) gel gel     HE WONT START IT FOR ABOUT A WEEK. HE ASKS IF CHAR JOSE WANTS HIM TO HAVE ANY TESTING BEFORE HE BEGINS THE MEDICATION.

## 2024-02-13 NOTE — TELEPHONE ENCOUNTER
Spoke with Emmanuel. Per provider notes- to try medication for 6 wks then repeat testosterone. Expressed understanding. Order mailed to patient to have performed at [facility].

## 2024-03-15 ENCOUNTER — OFFICE VISIT (OUTPATIENT)
Dept: FAMILY MEDICINE CLINIC | Facility: CLINIC | Age: 59
End: 2024-03-15
Payer: COMMERCIAL

## 2024-03-15 VITALS
DIASTOLIC BLOOD PRESSURE: 90 MMHG | OXYGEN SATURATION: 98 % | SYSTOLIC BLOOD PRESSURE: 138 MMHG | HEART RATE: 53 BPM | HEIGHT: 72 IN | RESPIRATION RATE: 18 BRPM | WEIGHT: 177.6 LBS | BODY MASS INDEX: 24.06 KG/M2 | TEMPERATURE: 97.7 F

## 2024-03-15 DIAGNOSIS — I10 ESSENTIAL HYPERTENSION: ICD-10-CM

## 2024-03-15 DIAGNOSIS — E29.1 HYPOGONADISM IN MALE: Primary | ICD-10-CM

## 2024-03-15 PROCEDURE — 99214 OFFICE O/P EST MOD 30 MIN: CPT | Performed by: NURSE PRACTITIONER

## 2024-03-15 RX ORDER — TADALAFIL 5 MG/1
5 TABLET ORAL DAILY
Qty: 30 TABLET | Refills: 0 | Status: SHIPPED | OUTPATIENT
Start: 2024-03-15

## 2024-03-15 NOTE — PROGRESS NOTES
"Chief Complaint  Medication Problem    Subjective        Emmanuel Carpenter presents to [facility]  History of Present Illness    Patient presents today with request for medication adjustment.  There is a current prescription for Cialis 5 mg every 3 days prn (reported previously taking 20 mg dose).  Prefers to take this medication daily, as it seems to provide better blood pressure control and overall feeling of wellness.  Reported stopping simvastatin 20 mg daily due to potential adverse effect; once discontinued, an isolated joint pain in right foot resolved.    Hypertension: Status is stable.  Current medication includes carvedilol 12.5 mg BID,   ramipril 7.5 mg daily recently decreased to 5 mg daily (taking 2.5 mg BID).  Currently participating in cardiac rehab program (past 5 years).  BP at rehab has been 108/77; HR 58, 101/63; HR 62.  Negative for headache, chest pain, palpitations, dizziness, visual disturbance, shortness of air, and lower extremity swelling.        Objective   Vital Signs:  /90 (BP Location: Left arm, Patient Position: Sitting, Cuff Size: Adult)   Pulse 53   Temp 97.7 °F (36.5 °C)   Resp 18   Ht 182.9 cm (72\")   Wt 80.6 kg (177 lb 9.6 oz)   SpO2 98%   BMI 24.09 kg/m²   Estimated body mass index is 24.09 kg/m² as calculated from the following:    Height as of this encounter: 182.9 cm (72\").    Weight as of this encounter: 80.6 kg (177 lb 9.6 oz).       BMI is within normal parameters. No other follow-up for BMI required.      Physical Exam  Constitutional:       General: He is not in acute distress.     Appearance: He is well-developed, well-groomed and normal weight.   Cardiovascular:      Rate and Rhythm: Normal rate and regular rhythm.      Chest Wall: PMI is not displaced.      Heart sounds: Normal heart sounds.   Pulmonary:      Effort: Pulmonary effort is normal.      Breath sounds: Normal breath sounds and air entry.   Musculoskeletal:      " Right lower leg: No edema.      Left lower leg: No edema.   Skin:     General: Skin is warm and dry.   Neurological:      Mental Status: He is alert and oriented to person, place, and time.      Gait: Gait is intact.   Psychiatric:         Mood and Affect: Mood normal.         Thought Content: Thought content normal.         Judgment: Judgment normal.        Result Review :    The following data was reviewed by: LUIS MANUEL Cueva on 03/15/2024:                  Data reviewed : Laboratory results from November and December 2023 reviewed from outside source.             Assessment and Plan     Diagnoses and all orders for this visit:    1. Hypogonadism in male (Primary)  Comments:  Will increase dose of Cialis 5 mg to daily dosing.  Follow-up in 4 weeks.  Orders:  -     tadalafil (CIALIS) 5 MG tablet; Take 1 tablet by mouth Daily.  Dispense: 30 tablet; Refill: 0    2. Essential hypertension  Comments:  Continue current medication therapy and follow-up with PCP in 4 weeks.      Advise discussion with PCP at next visit regarding stopping statin.        Follow Up     Return in about 4 weeks (around 4/12/2024) for follow up blood pressure/medication check Cialis .  Patient was given instructions and counseling regarding his condition or for health maintenance advice. Please see specific information pulled into the AVS if appropriate.

## 2024-04-17 ENCOUNTER — OFFICE VISIT (OUTPATIENT)
Dept: FAMILY MEDICINE CLINIC | Facility: CLINIC | Age: 59
End: 2024-04-17
Payer: COMMERCIAL

## 2024-04-17 VITALS
WEIGHT: 178.4 LBS | OXYGEN SATURATION: 97 % | DIASTOLIC BLOOD PRESSURE: 79 MMHG | HEIGHT: 72 IN | TEMPERATURE: 98.6 F | SYSTOLIC BLOOD PRESSURE: 124 MMHG | HEART RATE: 68 BPM | BODY MASS INDEX: 24.16 KG/M2

## 2024-04-17 DIAGNOSIS — K21.9 GASTROESOPHAGEAL REFLUX DISEASE WITHOUT ESOPHAGITIS: ICD-10-CM

## 2024-04-17 DIAGNOSIS — E29.1 HYPOGONADISM IN MALE: Primary | ICD-10-CM

## 2024-04-17 DIAGNOSIS — N52.9 ERECTILE DYSFUNCTION, UNSPECIFIED ERECTILE DYSFUNCTION TYPE: ICD-10-CM

## 2024-04-17 DIAGNOSIS — E78.2 MIXED HYPERLIPIDEMIA: ICD-10-CM

## 2024-04-17 DIAGNOSIS — R73.09 ABNORMAL GLUCOSE: ICD-10-CM

## 2024-04-17 DIAGNOSIS — I25.10 CORONARY ARTERY DISEASE INVOLVING NATIVE CORONARY ARTERY OF NATIVE HEART WITHOUT ANGINA PECTORIS: ICD-10-CM

## 2024-04-17 DIAGNOSIS — I10 ESSENTIAL HYPERTENSION: ICD-10-CM

## 2024-04-17 LAB
ALBUMIN SERPL-MCNC: 4.5 G/DL (ref 3.5–5.2)
ALBUMIN/GLOB SERPL: 1.7 G/DL
ALP SERPL-CCNC: 57 U/L (ref 39–117)
ALT SERPL W P-5'-P-CCNC: 18 U/L (ref 1–41)
ANION GAP SERPL CALCULATED.3IONS-SCNC: 8 MMOL/L (ref 5–15)
AST SERPL-CCNC: 15 U/L (ref 1–40)
BILIRUB SERPL-MCNC: 0.5 MG/DL (ref 0–1.2)
BUN SERPL-MCNC: 15 MG/DL (ref 6–20)
BUN/CREAT SERPL: 15 (ref 7–25)
CALCIUM SPEC-SCNC: 8.9 MG/DL (ref 8.6–10.5)
CHLORIDE SERPL-SCNC: 107 MMOL/L (ref 98–107)
CHOLEST SERPL-MCNC: 147 MG/DL (ref 0–200)
CO2 SERPL-SCNC: 24 MMOL/L (ref 22–29)
CREAT SERPL-MCNC: 1 MG/DL (ref 0.76–1.27)
DEPRECATED RDW RBC AUTO: 45.6 FL (ref 37–54)
EGFRCR SERPLBLD CKD-EPI 2021: 87.8 ML/MIN/1.73
ERYTHROCYTE [DISTWIDTH] IN BLOOD BY AUTOMATED COUNT: 12.7 % (ref 12.3–15.4)
GLOBULIN UR ELPH-MCNC: 2.7 GM/DL
GLUCOSE SERPL-MCNC: 104 MG/DL (ref 65–99)
HCT VFR BLD AUTO: 46.1 % (ref 37.5–51)
HDLC SERPL-MCNC: 48 MG/DL (ref 40–60)
HGB BLD-MCNC: 15.5 G/DL (ref 13–17.7)
LDLC SERPL CALC-MCNC: 83 MG/DL (ref 0–100)
LDLC/HDLC SERPL: 1.71 {RATIO}
MCH RBC QN AUTO: 32.3 PG (ref 26.6–33)
MCHC RBC AUTO-ENTMCNC: 33.6 G/DL (ref 31.5–35.7)
MCV RBC AUTO: 96 FL (ref 79–97)
PLATELET # BLD AUTO: 187 10*3/MM3 (ref 140–450)
PMV BLD AUTO: 10.3 FL (ref 6–12)
POTASSIUM SERPL-SCNC: 4.3 MMOL/L (ref 3.5–5.2)
PROT SERPL-MCNC: 7.2 G/DL (ref 6–8.5)
RBC # BLD AUTO: 4.8 10*6/MM3 (ref 4.14–5.8)
SODIUM SERPL-SCNC: 139 MMOL/L (ref 136–145)
TRIGL SERPL-MCNC: 84 MG/DL (ref 0–150)
TSH SERPL DL<=0.05 MIU/L-ACNC: 2.88 UIU/ML (ref 0.27–4.2)
VLDLC SERPL-MCNC: 16 MG/DL (ref 5–40)
WBC NRBC COR # BLD AUTO: 5.85 10*3/MM3 (ref 3.4–10.8)

## 2024-04-17 PROCEDURE — 80061 LIPID PANEL: CPT | Performed by: NURSE PRACTITIONER

## 2024-04-17 PROCEDURE — 82626 DEHYDROEPIANDROSTERONE: CPT | Performed by: NURSE PRACTITIONER

## 2024-04-17 PROCEDURE — 84443 ASSAY THYROID STIM HORMONE: CPT | Performed by: NURSE PRACTITIONER

## 2024-04-17 PROCEDURE — 85027 COMPLETE CBC AUTOMATED: CPT | Performed by: NURSE PRACTITIONER

## 2024-04-17 PROCEDURE — 84402 ASSAY OF FREE TESTOSTERONE: CPT | Performed by: NURSE PRACTITIONER

## 2024-04-17 PROCEDURE — 84403 ASSAY OF TOTAL TESTOSTERONE: CPT | Performed by: NURSE PRACTITIONER

## 2024-04-17 PROCEDURE — 80053 COMPREHEN METABOLIC PANEL: CPT | Performed by: NURSE PRACTITIONER

## 2024-04-17 PROCEDURE — 83036 HEMOGLOBIN GLYCOSYLATED A1C: CPT | Performed by: NURSE PRACTITIONER

## 2024-04-17 RX ORDER — PRASTERONE (DHEA) 50 MG
50 CAPSULE ORAL DAILY
COMMUNITY

## 2024-04-17 RX ORDER — TADALAFIL 5 MG/1
5 TABLET ORAL DAILY
Qty: 90 TABLET | Refills: 1 | Status: SHIPPED | OUTPATIENT
Start: 2024-04-17

## 2024-04-17 NOTE — PROGRESS NOTES
Chief Complaint  Chief Complaint   Patient presents with    Follow-up     1 month follow up           Subjective          Emmanuel Carpenter presents to [facility] for   History of Present Illness    Patient presents for 1 month follow-up for hypogonadism/ED, he is not doing testim 50mg gel daily that was prescribed, he is taking DHEA 50 mg supplement daily. Cialis was changed to 5 mg daily, he did not tolerate Cialis 20 mg every 72 hours as needed. He had previously been seen at a men's clinic, reports he stopped hcg and he self went up on the dose of clomid    HTN, with change of clomid his bp lowered and has self cut back on ramipril and carvedilol. He denies chest pain, headache, shortness of air, palpitations and swelling of extremities.     Hyperlipidemia/CAD with history of CABG, he follows with cardiology, he is taking Zetia, patient self stopped statin about 2mo ago due to foot pain and myalgia, pain has since resolved.     GERD, reports intermittent heart burn and mid epigastric abdominal discomfort, he goes to a chiropractor that does abdominal manipulation that helps.  He takes Pepcid, today he denies nausea, vomiting, constipation, abdominal pain and diarrhea. Cologuard was negative.         The following portions of the patient's history were reviewed and updated as appropriate: allergies, current medications, past family history, past medical history, past social history, past surgical history and problem list.    Past Medical History:   Diagnosis Date    Actinic keratosis     Acute kidney injury 10/03/2018    Aortic regurgitation 2016    Aortic stenosis 10/02/2018    Atrial fibrillation     Bronchitis     Cataract     Coronary artery disease     Detached retina     Family history of heart disease     GERD (gastroesophageal reflux disease)     History of chest [facility]/5/18-[facility]    History of echocardiogram 03/23/16-[facility]    HLD (hyperlipidemia)     Hx of chest x-ray  10/3/18-[facility]    Hypertension     Hypogonadism in male     Hypokalemia     Left ventricular hypertrophy by electrocardiogram     Mild concentric left ventricular hypertrophy 03/23/2016    Pelvis tilted     Pneumothorax 10/05/2018    Polycythemia     Recurrent acute suppurative otitis media of right ear without spontaneous rupture of tympanic membrane 5/13/2020    SVT (supraventricular tachycardia) Hx    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 Hamlin--100% Occulsion of RCA; Significant L Main Stenosis    CHOLECYSTECTOMY  2004    CORONARY ARTERY BYPASS GRAFT  10/03/2018    urgent X4   Dr Farris     Family History   Problem Relation Age of Onset    Heart defect Maternal Grandmother         Enlarged Heart    Atrial fibrillation Maternal Grandmother     Diabetes Maternal Grandmother     Hypertension Mother     Melanoma Father     Diabetes Father     Heart disease Maternal Uncle     Diabetes Paternal Grandmother      Social History     Tobacco Use    Smoking status: Never     Passive exposure: Never    Smokeless tobacco: Never   Substance Use Topics    Alcohol use: No       Current Outpatient Medications:     carvedilol (COREG) 12.5 MG tablet, Take 1 tablet by mouth 2 (Two) Times a Day With Meals., Disp: 180 tablet, Rfl: 3    clopidogrel (PLAVIX) 75 MG tablet, Take 1 tablet by mouth Daily., Disp: 90 tablet, Rfl: 3    ezetimibe (ZETIA) 10 MG tablet, Take 1 tablet by mouth Daily., Disp: 90 tablet, Rfl: 1    famotidine (PEPCID) 40 MG tablet, Take 1 tablet by mouth 2 (Two) Times a Day., Disp: 180 tablet, Rfl: 3    ramipril (ALTACE) 2.5 MG capsule, Take 1 capsule by mouth Every Morning., Disp: 90 capsule, Rfl: 1    tadalafil (CIALIS) 5 MG tablet, Take 1 tablet by mouth Daily., Disp: 90 tablet, Rfl: 1    Tretinoin, Facial Wrinkles, 0.05 % cream, Apply pea size  "amt to face daily, Disp: 20 g, Rfl: 2    DHEA 50 MG capsule, Take 50 mg by mouth Daily., Disp: , Rfl:     Testim 50 MG/5GM (1%) gel gel, Place 50 mg on the skin as directed by provider Daily., Disp: 5 g, Rfl: 2    Objective   Vital Signs:   /79 (BP Location: Left arm, Patient Position: Sitting, Cuff Size: Adult)   Pulse 68   Temp 98.6 °F (37 °C) (Temporal)   Ht 182.9 cm (72\")   Wt 80.9 kg (178 lb 6.4 oz)   SpO2 97%   BMI 24.20 kg/m²           Physical Exam  Constitutional:       General: He is not in acute distress.     Appearance: Normal appearance. He is well-developed. He is not ill-appearing or diaphoretic.   HENT:      Head: Normocephalic.   Eyes:      Conjunctiva/sclera: Conjunctivae normal.      Pupils: Pupils are equal, round, and reactive to light.   Neck:      Thyroid: No thyromegaly.      Vascular: No JVD.   Cardiovascular:      Rate and Rhythm: Normal rate and regular rhythm.      Heart sounds: Normal heart sounds. No murmur heard.  Pulmonary:      Effort: Pulmonary effort is normal. No respiratory distress.      Breath sounds: Normal breath sounds. No wheezing or rhonchi.   Abdominal:      General: Bowel sounds are normal. There is no distension.      Palpations: Abdomen is soft.      Tenderness: There is no abdominal tenderness.   Musculoskeletal:         General: No swelling or tenderness. Normal range of motion.      Cervical back: Normal range of motion and neck supple. No tenderness.   Lymphadenopathy:      Cervical: No cervical adenopathy.   Skin:     General: Skin is warm and dry.      Coloration: Skin is not jaundiced.      Findings: No erythema or rash.   Neurological:      General: No focal deficit present.      Mental Status: He is alert and oriented to person, place, and time. Mental status is at baseline.      Sensory: No sensory deficit.      Motor: No weakness.      Gait: Gait normal.   Psychiatric:         Mood and Affect: Mood normal.         Behavior: Behavior normal.       "   Thought Content: Thought content normal.         Judgment: Judgment normal.          Result Review :     No visits with results within 7 Day(s) from this visit.   Latest known visit with results is:   Hospital Outpatient Visit on 06/16/2023   Component Date Value Ref Range Status    BH CV STRESS PROTOCOL 1 06/16/2023 Gee   Final    Stage 1 06/16/2023 1.0   Final    HR Stage 1 06/16/2023 82   Final    BP Stage 1 06/16/2023 140/78   Final    Duration Min Stage 1 06/16/2023 3   Final    Duration Sec Stage 1 06/16/2023 0   Final    Grade Stage 1 06/16/2023 10   Final    Speed Stage 1 06/16/2023 1.7   Final    BH CV STRESS METS STAGE 1 06/16/2023 5.0   Final    Stage 2 06/16/2023 2.0   Final    HR Stage 2 06/16/2023 107   Final    BP Stage 2 06/16/2023 156/84   Final    Duration Min Stage 2 06/16/2023 3   Final    Duration Sec Stage 2 06/16/2023 0   Final    Grade Stage 2 06/16/2023 12   Final    Speed Stage 2 06/16/2023 2.5   Final    BH CV STRESS METS STAGE 2 06/16/2023 7.5   Final    Stage 3 06/16/2023 3.0   Final    HR Stage 3 06/16/2023 132   Final    BP Stage 3 06/16/2023 174/82   Final    Duration Min Stage 3 06/16/2023 3   Final    Duration Sec Stage 3 06/16/2023 0   Final    Grade Stage 3 06/16/2023 14   Final    Speed Stage 3 06/16/2023 3.4   Final    BH CV STRESS METS STAGE 3 06/16/2023 10.0   Final    Baseline HR 06/16/2023 65  bpm Final    Baseline BP 06/16/2023 146/88  mmHg Final    Peak HR 06/16/2023 144  bpm Final    Peak BP 06/16/2023 174/82  mmHg Final    Recovery HR 06/16/2023 99  bpm Final    Recovery BP 06/16/2023 142/80  mmHg Final    Target HR (85%) 06/16/2023 139  bpm Final    Max. Pred. HR (100%) 06/16/2023 164  bpm Final    Percent Max Pred HR 06/16/2023 87.80  % Final    Percent Target HR 06/16/2023 103  % Final    Nuclear Prior Study 06/16/2023 3.0   Final    BH CV REST NUCLEAR ISOTOPE DOSE 06/16/2023 10.4  mCi Final    BH CV STRESS NUCLEAR ISOTOPE DOSE 06/16/2023 30.2  mCi Final    Nuc  Stress EF 06/16/2023 64  % Final    Exercise duration (min) 06/16/2023 10  min Final    Estimated workload 06/16/2023 12.8  METS Final                  BMI is within normal parameters. No other follow-up for BMI required.           Assessment and Plan    Diagnoses and all orders for this visit:    1. Hypogonadism in male (Primary)  -     Testosterone (Free & Total), LC / MS  -     DHEA  -     tadalafil (CIALIS) 5 MG tablet; Take 1 tablet by mouth Daily.  Dispense: 90 tablet; Refill: 1    2. Essential hypertension  -     Lipid Panel  -     Comprehensive Metabolic Panel  -     CBC (No Diff)  -     TSH    3. Erectile dysfunction, unspecified erectile dysfunction type    4. Coronary artery disease involving native coronary artery of native heart without angina pectoris  -     Lipid Panel    5. Mixed hyperlipidemia  -     Lipid Panel    6. Gastroesophageal reflux disease without esophagitis    7. Abnormal glucose  -     Hemoglobin A1c      Discussed with patient I will not prescribe hCG or Clomid, he will need to return to men's clinic if he desires this and rec he discuss with Dr. Hamlin  He requests progesterone level, no indication for this  Recommend he discuss self medication changes with Dr. Hamlin  Ok to continue tadalafil  He is currently not using Testim, will check T levels today  Continue all other meds the same  Consider referral to GI for mid epigastric discomfort, possible EGD r/o hiatal hernia.       I spent 40 minutes caring for Emmanuel Carpenter on this date of service. This time includes time spent by me in the following activities: preparing for the visit, reviewing tests, performing a medically appropriate examination and/or evaluation , counseling and educating the patient/family/caregiver, ordering medications, tests, or procedures and documenting information in the medical record        Follow Up     Return in about 6 months (around 10/17/2024) for Recheck.  Patient was given instructions and counseling  regarding his condition or for health maintenance advice. Please see specific information pulled into the AVS if appropriate.        Part of this note may be an electronic transcription/translation of spoken language to printed text using the Dragon Dictation System

## 2024-04-17 NOTE — PROGRESS NOTES
Venipuncture Blood Specimen Collection  Venipuncture performed in right arm by Annette Rose MA with good hemostasis. Patient tolerated the procedure well without complications.   04/17/24   Annette Rose MA

## 2024-04-18 LAB — HBA1C MFR BLD: 5.7 % (ref 4.8–5.6)

## 2024-04-19 ENCOUNTER — TELEPHONE (OUTPATIENT)
Dept: FAMILY MEDICINE CLINIC | Facility: CLINIC | Age: 59
End: 2024-04-19
Payer: COMMERCIAL

## 2024-04-19 RX ORDER — SIMVASTATIN 10 MG
10 TABLET ORAL NIGHTLY
Qty: 90 TABLET | Refills: 3 | Status: SHIPPED | OUTPATIENT
Start: 2024-04-19

## 2024-04-19 NOTE — TELEPHONE ENCOUNTER
PATIENT CALLED TO REQUEST A PRESCRIPTION OF  simvastatin (ZOCOR) 20 MG tablet (NO ON CURRENT MED LIST)  AFTER SEEING HIS LAB RESULTS  HE WOULD LIKE TO BEGIN THIS MEDICATION AGAIN    [address] - [address] [facility] - [phone] [place] [phone] [facility] [phone]     CALL BACK NUMBER [phone]

## 2024-04-25 LAB
DHEA SERPL-MCNC: 326 NG/DL (ref 21–402)
TESTOST FREE SERPL-MCNC: 11.7 PG/ML (ref 7.2–24)
TESTOST SERPL-MCNC: 507 NG/DL (ref 264–916)

## 2024-06-11 ENCOUNTER — TELEPHONE (OUTPATIENT)
Dept: CARDIOLOGY | Facility: CLINIC | Age: 59
End: 2024-06-11

## 2024-06-11 NOTE — PROGRESS NOTES
Date of Office Visit: 2024  Encounter Provider: Dr. Timo Hamlin  Place of Service: [facility] [place]  Patient Name: Emmanuel Carpenter  :1965  Malinda Patel, LUIS MANUEL    Chief Complaint   Patient presents with    Coronary Artery Disease    Atrial Fibrillation    Hypertension    Hyperlipidemia    Follow-up     History of Present Illness    I am pleased to see Mr. Carpenter in my office today as a follow-up.     As you know, patient is 59years old white gentleman whose past medical history is significant for hypertension, hyperlipidemia, CAD, CABG, who came today for follow-up.     In 2018, patient was referred to me for symptom of angina pectoris.  Patient underwent stress test which was abnormal.  Patient underwent cardiac catheterization which showed three-vessel coronary artery disease with significant left main stenosis.  Patient underwent CABG x4.  In , patient underwent VANDANA and carotid Doppler they were unremarkable.    Patient came today for follow-up.  Patient blood pressure is very well-controlled.  Patient is very active.  Patient does 14,000 steps every day.  Patient denies any symptom of chest pain or tightness or heaviness.  No orthopnea PND no syncope or presyncope.  No leg edema noted.    EKG showed normal sinus rhythm.    At this stage, patient is doing fairly well.  I will continue current treatment.  Risk factor modification discussed.  I will see the patient in 1 year.        Past Medical History:   Diagnosis Date    Actinic keratosis     Acute kidney injury 10/03/2018    Consulted by Dr. Nunez    Aortic regurgitation 2016    Aortic stenosis 10/02/2018    L Main Noted on Cardiac Cath    Atrial fibrillation     Bronchitis     Cataract     Coronary artery disease     Detached retina     Family history of heart disease     Enlarged Hearts & Valve Replacements    GERD (gastroesophageal reflux disease)     History of chest x-ray 10/5/18-[facility]    Stable but Small L Apical  Pneumothorax    History of echocardiogram 03/23/16-[facility]    Mild Concentrive L Ventricular Hypertrophy; Moderate Aortic Reg; Mild Mitral Reg    HLD (hyperlipidemia)     Hx of chest x-ray 10/3/18-[facility]    Small L Apical Pneumothorax     Hypertension     Hypogonadism in male     Hypokalemia     Left ventricular hypertrophy by electrocardiogram     in the past by echo- mild.     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 Hamlin--100% Occulsion of RCA; Significant L Main Stenosis    CHOLECYSTECTOMY  2004    CORONARY ARTERY BYPASS GRAFT  10/03/2018    urgent X4   Dr Farris           Current Outpatient Medications:     carvedilol (COREG) 25 MG tablet, Take 1 tablet by mouth 2 (Two) Times a Day With Meals., Disp: , Rfl:     CLOMIPHENE CITRATE PO, Take 25 mg by mouth 2 (Two) Times a Day., Disp: , Rfl:     clopidogrel (PLAVIX) 75 MG tablet, Take 1 tablet by mouth Daily., Disp: 90 tablet, Rfl: 3    DHEA 50 MG capsule, Take 25 mg by mouth 2 (Two) Times a Day., Disp: , Rfl:     ezetimibe (ZETIA) 10 MG tablet, Take 1 tablet by mouth Daily., Disp: 90 tablet, Rfl: 1    famotidine (PEPCID) 40 MG tablet, Take 1 tablet by mouth 2 (Two) Times a Day., Disp: 180 tablet, Rfl: 3    pitavastatin calcium (LIVALO) 2 MG tablet tablet, Take 1 tablet by mouth Every Night., Disp: , Rfl:     ramipril (ALTACE) 2.5 MG capsule, Take 1 capsule by mouth Every Morning., Disp: 90 capsule, Rfl: 1    tadalafil (CIALIS) 5 MG tablet, Take 1 tablet by  "mouth Daily., Disp: 90 tablet, Rfl: 1    Testim 50 MG/5GM (1%) gel gel, Place 50 mg on the skin as directed by provider Daily., Disp: 5 g, Rfl: 2    Tretinoin, Facial Wrinkles, 0.05 % cream, Apply pea size amt to face daily, Disp: 20 g, Rfl: 2      Social History     Socioeconomic History    Marital status:      Spouse name: Fely   Tobacco Use    Smoking status: Never     Passive exposure: Never    Smokeless tobacco: Never   Vaping Use    Vaping status: Never Used   Substance and Sexual Activity    Alcohol use: No    Drug use: No    Sexual activity: Defer         Review of Systems   Constitutional: Negative for chills and fever.   HENT:  Negative for ear discharge and nosebleeds.    Eyes:  Negative for discharge and redness.   Cardiovascular:  Negative for chest pain, orthopnea, palpitations, paroxysmal nocturnal dyspnea and syncope.   Respiratory:  Negative for cough, shortness of breath and wheezing.    Endocrine: Negative for heat intolerance.   Skin:  Negative for rash.   Musculoskeletal:  Negative for arthritis and myalgias.   Gastrointestinal:  Negative for abdominal pain, melena, nausea and vomiting.   Genitourinary:  Negative for dysuria and hematuria.   Neurological:  Negative for dizziness, light-headedness, numbness and tremors.   Psychiatric/Behavioral:  Negative for depression. The patient is not nervous/anxious.        Procedures      ECG 12 Lead    Date/Time: 6/12/2024 1:40 PM  Performed by: Timo Hamlin MD    Authorized by: Timo Hamlin MD  Comparison: compared with previous ECG   Similar to previous ECG  Rhythm: sinus rhythm    Clinical impression: normal ECG          ECG 12 Lead    (Results Pending)           Objective:    /74 (BP Location: Right arm, Patient Position: Sitting, Cuff Size: Large Adult)   Pulse 74   Resp 16   Ht 182.9 cm (72.01\")   Wt 79.8 kg (176 lb)   SpO2 97%   BMI 23.86 kg/m²         Constitutional:       Appearance: Well-developed.   Eyes:      General: No " scleral icterus.        Right eye: No discharge.   HENT:      Head: Normocephalic and atraumatic.   Neck:      Thyroid: No thyromegaly.      Lymphadenopathy: No cervical adenopathy.   Pulmonary:      Effort: Pulmonary effort is normal. No respiratory distress.      Breath sounds: Normal breath sounds. No wheezing. No rales.   Cardiovascular:      Normal rate. Regular rhythm.      No gallop.    Edema:     Peripheral edema absent.   Abdominal:      Tenderness: There is no abdominal tenderness.   Skin:     Findings: No erythema or rash.   Neurological:      Mental Status: Alert and oriented to person, place, and time.             Assessment:       Diagnosis Plan   1. Coronary artery disease involving native coronary artery of native heart without angina pectoris  ECG 12 Lead      2. Paroxysmal atrial fibrillation  ECG 12 Lead               Plan:       MDM:    1.  CAD/CABG:    Patient is doing well from a cardiac standpoint.  Patient had recent stress test which was negative for ischemia.  Risk factor modification recommended continue aspirin    2.  Hyperlipidemia:    Patient is in sinus rhythm.  Recent LDL is 83 which is desirable.    3.  Postoperative atrial fibrillation:    Patient had postoperative atrial fibrillation and free of A-fib since then.

## 2024-06-11 NOTE — TELEPHONE ENCOUNTER
[facility] received a fax that requires your attention. The document has been indexed to the patient’s chart for your review.      Reason for sending: EXTERNAL MEDICAL RECORD NOTIFICATION     Documents Description: REHAB; CARDIOPULMONARY REHAB MONTHLY REPORT-[facility]-6.11.24    Name of Sender: [facility]     Date Indexed: 6.11.24

## 2024-06-12 ENCOUNTER — OFFICE VISIT (OUTPATIENT)
Dept: CARDIOLOGY | Facility: CLINIC | Age: 59
End: 2024-06-12
Payer: COMMERCIAL

## 2024-06-12 VITALS
SYSTOLIC BLOOD PRESSURE: 137 MMHG | BODY MASS INDEX: 23.84 KG/M2 | OXYGEN SATURATION: 97 % | DIASTOLIC BLOOD PRESSURE: 74 MMHG | WEIGHT: 176 LBS | HEART RATE: 74 BPM | RESPIRATION RATE: 16 BRPM | HEIGHT: 72 IN

## 2024-06-12 DIAGNOSIS — I25.10 CORONARY ARTERY DISEASE INVOLVING NATIVE CORONARY ARTERY OF NATIVE HEART WITHOUT ANGINA PECTORIS: Primary | ICD-10-CM

## 2024-06-12 DIAGNOSIS — I48.0 PAROXYSMAL ATRIAL FIBRILLATION: ICD-10-CM

## 2024-06-12 PROCEDURE — 93000 ELECTROCARDIOGRAM COMPLETE: CPT | Performed by: INTERNAL MEDICINE

## 2024-06-12 PROCEDURE — 99214 OFFICE O/P EST MOD 30 MIN: CPT | Performed by: INTERNAL MEDICINE

## 2024-06-12 RX ORDER — PITAVASTATIN CALCIUM 2.09 MG/1
2 TABLET, FILM COATED ORAL NIGHTLY
COMMUNITY

## 2024-06-12 RX ORDER — CARVEDILOL 25 MG/1
25 TABLET ORAL 2 TIMES DAILY WITH MEALS
COMMUNITY

## 2024-07-09 ENCOUNTER — LAB (OUTPATIENT)
Dept: FAMILY MEDICINE CLINIC | Facility: CLINIC | Age: 59
End: 2024-07-09
Payer: COMMERCIAL

## 2024-07-09 DIAGNOSIS — I10 ESSENTIAL HYPERTENSION: ICD-10-CM

## 2024-07-09 DIAGNOSIS — E78.2 MIXED HYPERLIPIDEMIA: ICD-10-CM

## 2024-07-09 DIAGNOSIS — E29.1 HYPOGONADISM IN MALE: ICD-10-CM

## 2024-07-09 DIAGNOSIS — Z13.21 ENCOUNTER FOR VITAMIN DEFICIENCY SCREENING: Primary | ICD-10-CM

## 2024-07-09 DIAGNOSIS — Z12.5 SCREENING PSA (PROSTATE SPECIFIC ANTIGEN): ICD-10-CM

## 2024-10-13 DIAGNOSIS — E29.1 HYPOGONADISM IN MALE: ICD-10-CM

## 2024-10-14 RX ORDER — TADALAFIL 5 MG/1
5 TABLET ORAL DAILY
Qty: 90 TABLET | Refills: 0 | Status: SHIPPED | OUTPATIENT
Start: 2024-10-14

## 2024-10-17 ENCOUNTER — LAB (OUTPATIENT)
Dept: FAMILY MEDICINE CLINIC | Facility: CLINIC | Age: 59
End: 2024-10-17
Payer: COMMERCIAL

## 2024-10-17 ENCOUNTER — OFFICE VISIT (OUTPATIENT)
Dept: FAMILY MEDICINE CLINIC | Facility: CLINIC | Age: 59
End: 2024-10-17
Payer: COMMERCIAL

## 2024-10-17 VITALS
OXYGEN SATURATION: 98 % | HEIGHT: 72 IN | DIASTOLIC BLOOD PRESSURE: 78 MMHG | SYSTOLIC BLOOD PRESSURE: 110 MMHG | BODY MASS INDEX: 23.89 KG/M2 | WEIGHT: 176.4 LBS | HEART RATE: 59 BPM

## 2024-10-17 DIAGNOSIS — Z00.00 PREVENTATIVE HEALTH CARE: Primary | ICD-10-CM

## 2024-10-17 DIAGNOSIS — Z95.1 S/P CABG (CORONARY ARTERY BYPASS GRAFT): ICD-10-CM

## 2024-10-17 DIAGNOSIS — E29.1 HYPOGONADISM IN MALE: ICD-10-CM

## 2024-10-17 DIAGNOSIS — Z13.21 ENCOUNTER FOR VITAMIN DEFICIENCY SCREENING: ICD-10-CM

## 2024-10-17 DIAGNOSIS — I10 ESSENTIAL HYPERTENSION: ICD-10-CM

## 2024-10-17 DIAGNOSIS — E55.9 VITAMIN D DEFICIENCY: ICD-10-CM

## 2024-10-17 DIAGNOSIS — E78.2 MIXED HYPERLIPIDEMIA: ICD-10-CM

## 2024-10-17 DIAGNOSIS — Z12.5 SCREENING PSA (PROSTATE SPECIFIC ANTIGEN): ICD-10-CM

## 2024-10-17 PROCEDURE — 84403 ASSAY OF TOTAL TESTOSTERONE: CPT | Performed by: NURSE PRACTITIONER

## 2024-10-17 PROCEDURE — 82306 VITAMIN D 25 HYDROXY: CPT | Performed by: NURSE PRACTITIONER

## 2024-10-17 PROCEDURE — 85027 COMPLETE CBC AUTOMATED: CPT | Performed by: NURSE PRACTITIONER

## 2024-10-17 PROCEDURE — 80061 LIPID PANEL: CPT | Performed by: NURSE PRACTITIONER

## 2024-10-17 PROCEDURE — 82607 VITAMIN B-12: CPT | Performed by: NURSE PRACTITIONER

## 2024-10-17 PROCEDURE — G0103 PSA SCREENING: HCPCS | Performed by: NURSE PRACTITIONER

## 2024-10-17 PROCEDURE — 84443 ASSAY THYROID STIM HORMONE: CPT | Performed by: NURSE PRACTITIONER

## 2024-10-17 PROCEDURE — 36415 COLL VENOUS BLD VENIPUNCTURE: CPT | Performed by: NURSE PRACTITIONER

## 2024-10-17 PROCEDURE — 84402 ASSAY OF FREE TESTOSTERONE: CPT | Performed by: NURSE PRACTITIONER

## 2024-10-17 PROCEDURE — 82626 DEHYDROEPIANDROSTERONE: CPT | Performed by: NURSE PRACTITIONER

## 2024-10-17 PROCEDURE — 80053 COMPREHEN METABOLIC PANEL: CPT | Performed by: NURSE PRACTITIONER

## 2024-10-17 PROCEDURE — 99214 OFFICE O/P EST MOD 30 MIN: CPT | Performed by: NURSE PRACTITIONER

## 2024-10-17 PROCEDURE — 86803 HEPATITIS C AB TEST: CPT | Performed by: NURSE PRACTITIONER

## 2024-10-17 PROCEDURE — 84207 ASSAY OF VITAMIN B-6: CPT | Performed by: NURSE PRACTITIONER

## 2024-10-17 RX ORDER — CARVEDILOL 12.5 MG/1
12.5 TABLET ORAL 2 TIMES DAILY WITH MEALS
COMMUNITY
Start: 2024-07-20

## 2024-10-17 RX ORDER — EZETIMIBE 10 MG/1
10 TABLET ORAL DAILY
Qty: 90 TABLET | Refills: 1 | Status: SHIPPED | OUTPATIENT
Start: 2024-10-17

## 2024-10-17 RX ORDER — ERGOCALCIFEROL (VITAMIN D2) 10 MCG
400 TABLET ORAL DAILY
COMMUNITY

## 2024-10-17 RX ORDER — RAMIPRIL 2.5 MG/1
2.5 CAPSULE ORAL EVERY MORNING
Qty: 90 CAPSULE | Refills: 1 | Status: SHIPPED | OUTPATIENT
Start: 2024-10-17

## 2024-10-17 NOTE — PROGRESS NOTES
"Chief Complaint  Chief Complaint   Patient presents with    Follow-up     6 month-     Hypertension    Hyperlipidemia    Hypogonadism           Subjective          Emmanuel Carpenter presents to [facility] for   History of Present Illness    HTN, stable on carvedilol and takes as directed, denies chest pain, headache, shortness of air, palpitations and swelling of extremities.     Hyperlipidemia/CAD, history of CABG, follows with Dr. Hamlin, the patient denies muscle aches, constipation, diarrhea, GI upset, fatigue, chest pain/pressure, exercise intolerance, dyspnea, palpitations, syncope and pedal edema.      ED/hypogonadism, he is taking DHEA, currently not taking topical testosterone, he did however start taking cialis 10mg daily-has old supply of 20mg tabs he is cutting in half, reports \"I feel better and would like to continue 10mg daily\" his PSA has been normal, denies dysuria, hesitancy, hematuria, nocturia.        The following portions of the patient's history were reviewed and updated as appropriate: allergies, current medications, past family history, past medical history, past social history, past surgical history and problem list.    Past Medical History:   Diagnosis Date    Actinic keratosis     Acute kidney injury 10/03/2018    Aortic regurgitation 2016    Aortic stenosis 10/02/2018    Atrial fibrillation     Bronchitis     Cataract     Coronary artery disease     Detached retina     Family history of heart disease     GERD (gastroesophageal reflux disease)     History of chest x-ray 10/5/18-F    History of echocardiogram 03/23/16-F    HLD (hyperlipidemia)     Hx of chest x-ray 10/3/18-F    Hypertension     Hypogonadism in male     Hypokalemia     Left ventricular hypertrophy by electrocardiogram     Mild concentric left ventricular hypertrophy 03/23/2016    Pelvis tilted     Pneumothorax 10/05/2018    Polycythemia     Recurrent acute suppurative otitis media of right " ear without spontaneous rupture of tympanic membrane 5/13/2020    SVT (supraventricular tachycardia) Hx    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 Hamlin--100% Occulsion of RCA; Significant L Main Stenosis    CHOLECYSTECTOMY  2004    CORONARY ARTERY BYPASS GRAFT  10/03/2018    urgent X4   Dr Farris     Family History   Problem Relation Age of Onset    Heart defect Maternal Grandmother         Enlarged Heart    Atrial fibrillation Maternal Grandmother     Diabetes Maternal Grandmother     Hypertension Mother     Melanoma Father     Diabetes Father     Heart disease Maternal Uncle     Diabetes Paternal Grandmother      Social History     Tobacco Use    Smoking status: Never     Passive exposure: Never    Smokeless tobacco: Never   Substance Use Topics    Alcohol use: No       Current Outpatient Medications:     carvedilol (COREG) 12.5 MG tablet, Take 1 tablet by mouth 2 (Two) Times a Day With Meals., Disp: , Rfl:     CLOMIPHENE CITRATE PO, Take 25 mg by mouth 2 (Two) Times a Day., Disp: , Rfl:     clopidogrel (PLAVIX) 75 MG tablet, Take 1 tablet by mouth Daily., Disp: 90 tablet, Rfl: 3    DHEA 50 MG capsule, Take 25 mg by mouth Daily., Disp: , Rfl:     ezetimibe (ZETIA) 10 MG tablet, Take 1 tablet by mouth Daily., Disp: 90 tablet, Rfl: 1    famotidine (PEPCID) 40 MG tablet, Take 1 tablet by mouth 2 (Two) Times a Day., Disp: 180 tablet, Rfl: 3    pitavastatin calcium (LIVALO) 2 MG tablet tablet, Take 1 tablet by mouth Every Night., Disp: , Rfl:     ramipril (ALTACE) 2.5 MG capsule, Take 1 capsule by mouth Every Morning., Disp: 90 capsule, Rfl: 1    tadalafil (CIALIS) 5 MG tablet, Take 1 tablet by mouth once daily (Patient taking differently: Take 2 tablets by mouth Daily.), Disp: 90 tablet, Rfl: 0    Tretinoin, Facial Wrinkles, 0.05 % cream, Apply  "pea size amt to face daily, Disp: 20 g, Rfl: 2    Vitamin D, Cholecalciferol, (CHOLECALCIFEROL) 10 MCG (400 UNIT) tablet, Take 1 tablet by mouth Daily., Disp: , Rfl:     Objective   Vital Signs:   /78 (BP Location: Left arm, Patient Position: Sitting, Cuff Size: Adult)   Pulse 59   Ht 182.9 cm (72\")   Wt 80 kg (176 lb 6.4 oz)   SpO2 98%   BMI 23.92 kg/m²           Physical Exam  Constitutional:       General: He is not in acute distress.     Appearance: Normal appearance. He is well-developed. He is not ill-appearing or diaphoretic.   HENT:      Head: Normocephalic.   Eyes:      Conjunctiva/sclera: Conjunctivae normal.      Pupils: Pupils are equal, round, and reactive to light.   Neck:      Thyroid: No thyromegaly.      Vascular: No JVD.   Cardiovascular:      Rate and Rhythm: Normal rate and regular rhythm.      Heart sounds: Normal heart sounds. No murmur heard.  Pulmonary:      Effort: Pulmonary effort is normal. No respiratory distress.      Breath sounds: Normal breath sounds. No wheezing or rhonchi.   Abdominal:      General: Bowel sounds are normal. There is no distension.      Palpations: Abdomen is soft.      Tenderness: There is no abdominal tenderness.   Musculoskeletal:         General: No swelling or tenderness. Normal range of motion.      Cervical back: Normal range of motion and neck supple. No tenderness.   Lymphadenopathy:      Cervical: No cervical adenopathy.   Skin:     General: Skin is warm and dry.      Coloration: Skin is not jaundiced.      Findings: No erythema or rash.   Neurological:      General: No focal deficit present.      Mental Status: He is alert and oriented to person, place, and time. Mental status is at baseline.      Sensory: No sensory deficit.      Motor: No weakness.      Gait: Gait normal.   Psychiatric:         Mood and Affect: Mood normal.         Behavior: Behavior normal.         Thought Content: Thought content normal.         Judgment: Judgment normal. "          Result Review :     Lab on 10/17/2024   Component Date Value Ref Range Status    25 Hydroxy, Vitamin D 10/17/2024 50.8  30.0 - 100.0 ng/ml Final    Vitamin B-12 10/17/2024 511  211 - 946 pg/mL Final    Total Cholesterol 10/17/2024 111  0 - 200 mg/dL Final    Triglycerides 10/17/2024 47  0 - 150 mg/dL Final    HDL Cholesterol 10/17/2024 46  40 - 60 mg/dL Final    LDL Cholesterol  10/17/2024 53  0 - 100 mg/dL Final    VLDL Cholesterol 10/17/2024 12  5 - 40 mg/dL Final    LDL/HDL Ratio 10/17/2024 1.21   Final    Glucose 10/17/2024 93  65 - 99 mg/dL Final    BUN 10/17/2024 18  6 - 20 mg/dL Final    Creatinine 10/17/2024 0.99  0.76 - 1.27 mg/dL Final    Sodium 10/17/2024 141  136 - 145 mmol/L Final    Potassium 10/17/2024 4.5  3.5 - 5.2 mmol/L Final    Chloride 10/17/2024 107  98 - 107 mmol/L Final    CO2 10/17/2024 24.9  22.0 - 29.0 mmol/L Final    Calcium 10/17/2024 9.4  8.6 - 10.5 mg/dL Final    Total Protein 10/17/2024 6.9  6.0 - 8.5 g/dL Final    Albumin 10/17/2024 4.3  3.5 - 5.2 g/dL Final    ALT (SGPT) 10/17/2024 16  1 - 41 U/L Final    AST (SGOT) 10/17/2024 21  1 - 40 U/L Final    Alkaline Phosphatase 10/17/2024 64  39 - 117 U/L Final    Total Bilirubin 10/17/2024 0.5  0.0 - 1.2 mg/dL Final    Globulin 10/17/2024 2.6  gm/dL Final    A/G Ratio 10/17/2024 1.7  g/dL Final    BUN/Creatinine Ratio 10/17/2024 18.2  7.0 - 25.0 Final    Anion Gap 10/17/2024 9.1  5.0 - 15.0 mmol/L Final    eGFR 10/17/2024 88.9  >60.0 mL/min/1.73 Final    WBC 10/17/2024 5.98  3.40 - 10.80 10*3/mm3 Final    RBC 10/17/2024 4.79  4.14 - 5.80 10*6/mm3 Final    Hemoglobin 10/17/2024 15.3  13.0 - 17.7 g/dL Final    Hematocrit 10/17/2024 47.2  37.5 - 51.0 % Final    MCV 10/17/2024 98.5 (H)  79.0 - 97.0 fL Final    MCH 10/17/2024 31.9  26.6 - 33.0 pg Final    MCHC 10/17/2024 32.4  31.5 - 35.7 g/dL Final    RDW 10/17/2024 12.5  12.3 - 15.4 % Final    RDW-SD 10/17/2024 45.4  37.0 - 54.0 fl Final    MPV 10/17/2024 10.4  6.0 - 12.0 fL Final     Platelets 10/17/2024 182  140 - 450 10*3/mm3 Final    TSH 10/17/2024 2.370  0.270 - 4.200 uIU/mL Final    PSA 10/17/2024 1.010  0.000 - 4.000 ng/mL Final   Office Visit on 10/17/2024   Component Date Value Ref Range Status    Hepatitis C Ab 10/17/2024 Non-Reactive  Non-Reactive Final                  BMI is within normal parameters. No other follow-up for BMI required.           Assessment and Plan    Diagnoses and all orders for this visit:    1. Preventative health care (Primary)  -     Hepatitis C Antibody    2. Mixed hyperlipidemia  -     ezetimibe (ZETIA) 10 MG tablet; Take 1 tablet by mouth Daily.  Dispense: 90 tablet; Refill: 1    3. Hypogonadism in male    4. Essential hypertension    5. S/P CABG (coronary artery bypass graft)    6. Vitamin D deficiency    Other orders  -     ramipril (ALTACE) 2.5 MG capsule; Take 1 capsule by mouth Every Morning.  Dispense: 90 capsule; Refill: 1    Conditions stable, rf meds as above  Continue current medication regimen  Follow-up with cardiology as directed  Consider resuming testosterone if indicated  No indication for increasing Cialis to 10mg  Labs reviewed and essentially stable      I spent 30 minutes caring for Emmanuel Carpenter on this date of service. This time includes time spent by me in the following activities: preparing for the visit, reviewing tests, performing a medically appropriate examination and/or evaluation , counseling and educating the patient/family/caregiver, ordering medications, tests, or procedures and documenting information in the medical record        Follow Up     Return in about 6 months (around 4/17/2025) for Recheck, Annual physical.  Patient was given instructions and counseling regarding his condition or for health maintenance advice. Please see specific information pulled into the AVS if appropriate.        Part of this note may be an electronic transcription/translation of spoken language to printed text using the Dragon Dictation  System

## 2024-10-18 LAB
25(OH)D3 SERPL-MCNC: 50.8 NG/ML (ref 30–100)
ALBUMIN SERPL-MCNC: 4.3 G/DL (ref 3.5–5.2)
ALBUMIN/GLOB SERPL: 1.7 G/DL
ALP SERPL-CCNC: 64 U/L (ref 39–117)
ALT SERPL W P-5'-P-CCNC: 16 U/L (ref 1–41)
ANION GAP SERPL CALCULATED.3IONS-SCNC: 9.1 MMOL/L (ref 5–15)
AST SERPL-CCNC: 21 U/L (ref 1–40)
BILIRUB SERPL-MCNC: 0.5 MG/DL (ref 0–1.2)
BUN SERPL-MCNC: 18 MG/DL (ref 6–20)
BUN/CREAT SERPL: 18.2 (ref 7–25)
CALCIUM SPEC-SCNC: 9.4 MG/DL (ref 8.6–10.5)
CHLORIDE SERPL-SCNC: 107 MMOL/L (ref 98–107)
CHOLEST SERPL-MCNC: 111 MG/DL (ref 0–200)
CO2 SERPL-SCNC: 24.9 MMOL/L (ref 22–29)
CREAT SERPL-MCNC: 0.99 MG/DL (ref 0.76–1.27)
DEPRECATED RDW RBC AUTO: 45.4 FL (ref 37–54)
EGFRCR SERPLBLD CKD-EPI 2021: 88.9 ML/MIN/1.73
ERYTHROCYTE [DISTWIDTH] IN BLOOD BY AUTOMATED COUNT: 12.5 % (ref 12.3–15.4)
GLOBULIN UR ELPH-MCNC: 2.6 GM/DL
GLUCOSE SERPL-MCNC: 93 MG/DL (ref 65–99)
HCT VFR BLD AUTO: 47.2 % (ref 37.5–51)
HCV AB SER QL: NORMAL
HDLC SERPL-MCNC: 46 MG/DL (ref 40–60)
HGB BLD-MCNC: 15.3 G/DL (ref 13–17.7)
LDLC SERPL CALC-MCNC: 53 MG/DL (ref 0–100)
LDLC/HDLC SERPL: 1.21 {RATIO}
MCH RBC QN AUTO: 31.9 PG (ref 26.6–33)
MCHC RBC AUTO-ENTMCNC: 32.4 G/DL (ref 31.5–35.7)
MCV RBC AUTO: 98.5 FL (ref 79–97)
PLATELET # BLD AUTO: 182 10*3/MM3 (ref 140–450)
PMV BLD AUTO: 10.4 FL (ref 6–12)
POTASSIUM SERPL-SCNC: 4.5 MMOL/L (ref 3.5–5.2)
PROT SERPL-MCNC: 6.9 G/DL (ref 6–8.5)
PSA SERPL-MCNC: 1.01 NG/ML (ref 0–4)
RBC # BLD AUTO: 4.79 10*6/MM3 (ref 4.14–5.8)
SODIUM SERPL-SCNC: 141 MMOL/L (ref 136–145)
TRIGL SERPL-MCNC: 47 MG/DL (ref 0–150)
TSH SERPL DL<=0.05 MIU/L-ACNC: 2.37 UIU/ML (ref 0.27–4.2)
VIT B12 BLD-MCNC: 511 PG/ML (ref 211–946)
VLDLC SERPL-MCNC: 12 MG/DL (ref 5–40)
WBC NRBC COR # BLD AUTO: 5.98 10*3/MM3 (ref 3.4–10.8)

## 2024-10-24 LAB — DHEA SERPL-MCNC: 237 NG/DL (ref 21–402)

## 2024-10-25 LAB — PYRIDOXAL PHOS SERPL-MCNC: 16.1 UG/L (ref 3.4–65.2)

## 2024-10-26 LAB
TESTOST FREE SERPL-MCNC: 9.6 PG/ML (ref 7.2–24)
TESTOST SERPL-MCNC: 482.6 NG/DL (ref 264–916)

## 2025-01-13 DIAGNOSIS — E29.1 HYPOGONADISM IN MALE: ICD-10-CM

## 2025-01-13 RX ORDER — CARVEDILOL 12.5 MG/1
12.5 TABLET ORAL 2 TIMES DAILY WITH MEALS
Qty: 180 TABLET | Refills: 0 | Status: SHIPPED | OUTPATIENT
Start: 2025-01-13

## 2025-01-13 RX ORDER — TADALAFIL 5 MG/1
5 TABLET ORAL DAILY
Qty: 90 TABLET | Refills: 0 | Status: SHIPPED | OUTPATIENT
Start: 2025-01-13

## 2025-01-13 RX ORDER — CLOPIDOGREL BISULFATE 75 MG/1
75 TABLET ORAL DAILY
Qty: 90 TABLET | Refills: 0 | Status: SHIPPED | OUTPATIENT
Start: 2025-01-13

## 2025-06-10 ENCOUNTER — OFFICE VISIT (OUTPATIENT)
Dept: CARDIOLOGY | Facility: CLINIC | Age: 60
End: 2025-06-10
Payer: COMMERCIAL

## 2025-06-10 VITALS
SYSTOLIC BLOOD PRESSURE: 145 MMHG | OXYGEN SATURATION: 96 % | RESPIRATION RATE: 16 BRPM | HEIGHT: 72 IN | HEART RATE: 69 BPM | WEIGHT: 183 LBS | BODY MASS INDEX: 24.79 KG/M2 | DIASTOLIC BLOOD PRESSURE: 77 MMHG

## 2025-06-10 DIAGNOSIS — I10 ESSENTIAL HYPERTENSION: ICD-10-CM

## 2025-06-10 DIAGNOSIS — E78.2 MIXED HYPERLIPIDEMIA: ICD-10-CM

## 2025-06-10 DIAGNOSIS — I34.0 MITRAL VALVE INSUFFICIENCY, UNSPECIFIED ETIOLOGY: Primary | ICD-10-CM

## 2025-06-10 DIAGNOSIS — I25.10 CORONARY ARTERY DISEASE INVOLVING NATIVE CORONARY ARTERY OF NATIVE HEART WITHOUT ANGINA PECTORIS: ICD-10-CM

## 2025-06-10 PROCEDURE — 99214 OFFICE O/P EST MOD 30 MIN: CPT | Performed by: NURSE PRACTITIONER

## 2025-06-10 PROCEDURE — 93000 ELECTROCARDIOGRAM COMPLETE: CPT | Performed by: NURSE PRACTITIONER

## 2025-06-10 RX ORDER — LEVOTHYROXINE, LIOTHYRONINE 38; 9 UG/1; UG/1
60 TABLET ORAL DAILY
COMMUNITY

## 2025-06-10 NOTE — PROGRESS NOTES
Cardiology Office Follow Up Visit      Primary Care Provider:  Melissa Lanier APRN    Reason for f/u:     Coronary artery disease  Previous CABG  Hypertension  Dyslipidemia      Subjective     CC:    Denies chest pain or dyspnea    History of Present Illness       Emmanuel Carpenter is a 60 y.o. male.  The patient has a past medical history of coronary artery disease.  In August 2018 he underwent cardiac catheterization which showed three-vessel coronary artery disease and significant left main stenosis.  He underwent four-vessel bypass surgery.  Postoperatively he did have paroxysmal atrial fibrillation.  He previously was on amiodarone which has been discontinued and the patient has been maintaining sinus rhythm.     In 2019 the patient underwent a carotid Doppler and VANDANA studies that were unremarkable.    2023 patient had a stress Myoview that showed no evidence of reversible ischemia.     He denies any current or recent chest pain, dyspnea, PND, orthopnea, palpitations, near syncope, lower extremity edema or feelings of his heart racing.  He is compliant with medical therapy.       Last lipid panel in October 2024 showed his total cholesterol be 111, triglycerides 47, HDL 46, LDL 53.     Patient denies any pain, dyspnea, PND, orthopnea, palpitations, near syncope, lower extremity edema or feelings of his heart racing.    We reviewed his last echocardiogram which was done in 2018.At that time he had mild MR, mild AI EF 60 to 65%.        ASSESSMENT/PLAN:      Diagnoses and all orders for this visit:    1. Mitral valve insufficiency, unspecified etiology (Primary)  -     Adult Transthoracic Echo Complete W/ Cont if Necessary Per Protocol; Future    2. Coronary artery disease involving native coronary artery of native heart without angina pectoris    3. Essential hypertension    4. Mixed hyperlipidemia    Other orders  -     ECG 12 Lead            MEDICAL DECISION MAKING:    Overall patient appears to be well  compensated.  His blood pressure stable.  EKG shows sinus rhythm with heart rate of 69 no acute ST or T wave segment abnormalities.  Blood pressure stable.  We reviewed his last lipid panel which is showing LDL at goal.  It has been over 7 years since his last echocardiogram.  I repeat commend repeating echocardiogram to reassess aortic and mitral valve function.  Patient will have scheduled follow-up in 1 year with Dr. Hamlin.  If he develops any new problems of asked him to contact the office sooner.        Past Medical History:   Diagnosis Date    Actinic keratosis     Acute kidney injury 10/03/2018    Consulted by Dr. Nunez    Aortic regurgitation 2016    Aortic stenosis 10/02/2018    L Main Noted on Cardiac Cath    Atrial fibrillation     Bronchitis     Cataract     Coronary artery disease     Detached retina     Family history of heart disease     Enlarged Hearts & Valve Replacements    GERD (gastroesophageal reflux disease)     History of chest x-ray 10/5/18-[facility]    Stable but Small L Apical Pneumothorax    History of echocardiogram 03/23/16-[facility]    Mild Concentrive L Ventricular Hypertrophy; Moderate Aortic Reg; Mild Mitral Reg    HLD (hyperlipidemia)     Hx of chest x-ray 10/3/18-[facility]    Small L Apical Pneumothorax     Hypertension     Hypogonadism in male     Hypokalemia     Left ventricular hypertrophy by electrocardiogram     in the past by echo- mild.     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 Hamlin--100% Occulsion of RCA; Significant L Main Stenosis    CHOLECYSTECTOMY  2004    CORONARY ARTERY BYPASS GRAFT  10/03/2018    urgent X4   Dr Farris         Current Outpatient Medications:     carvedilol (COREG) 12.5 MG tablet, TAKE 1 TABLET BY MOUTH TWICE DAILY WITH MEALS, Disp: 180 tablet, Rfl: 0    CLOMIPHENE CITRATE PO, Take 25 mg by mouth 2 (Two) Times a Day. (Patient taking differently: Take 50 mg by mouth Every Other Day.), Disp: , Rfl:     clopidogrel (PLAVIX) 75 MG tablet, Take 1 tablet by mouth once daily, Disp: 90 tablet, Rfl: 0    ezetimibe (ZETIA) 10 MG tablet, Take 1 tablet by mouth Daily., Disp: 90 tablet, Rfl: 1    famotidine (PEPCID) 40 MG tablet, Take 1 tablet by mouth 2 (Two) Times a Day., Disp: 180 tablet, Rfl: 3    NP Thyroid 60 MG tablet, Take 1 tablet by mouth Daily. on an empty stomach, Disp: , Rfl:     pitavastatin calcium (LIVALO) 2 MG tablet tablet, Take 1 tablet by mouth Every Night., Disp: , Rfl:     ramipril (ALTACE) 2.5 MG capsule, Take 1 capsule by mouth Every Morning. (Patient taking differently: Take 1 capsule by mouth 2 (Two) Times a Day.), Disp: 90 capsule, Rfl: 1    tadalafil (CIALIS) 5 MG tablet, Take 1 tablet by mouth once daily (Patient taking differently: Take 2 tablets by mouth Daily.), Disp: 90 tablet, Rfl: 0    TESTOSTERONE CYPIONATE IJ, Inject 70 mg as directed 1 (One) Time Per Week., Disp: , Rfl:     Tretinoin, Facial Wrinkles, 0.05 % cream, Apply pea size amt to face daily, Disp: 20 g, Rfl: 2    Vitamin D, Cholecalciferol, (CHOLECALCIFEROL) 10 MCG (400 UNIT) tablet, Take 1 tablet by mouth Daily. (Patient taking differently: Take 10 tablets by mouth Daily.), Disp: , Rfl:     Social History     Socioeconomic History    Marital status:      Spouse name: Fely   Tobacco Use    Smoking status: Never     Passive exposure: Never    Smokeless tobacco: Never   Vaping Use    Vaping status: Never Used   Substance and Sexual Activity     "Alcohol use: No    Drug use: No    Sexual activity: Defer       Family History   Problem Relation Age of Onset    Heart defect Maternal Grandmother         Enlarged Heart    Atrial fibrillation Maternal Grandmother     Diabetes Maternal Grandmother     Hypertension Mother     Melanoma Father     Diabetes Father     Heart disease Maternal Uncle     Diabetes Paternal Grandmother        The following portions of the patient's history were reviewed and updated as appropriate: allergies, current medications, past family history, past medical history, past social history, past surgical history and problem list.    Review of Systems   All other systems reviewed and are negative.      Pertinent items are noted in HPI, all other systems reviewed and negative    /77 (BP Location: Right arm, Patient Position: Sitting, Cuff Size: Large Adult)   Pulse 69   Resp 16   Ht 182.9 cm (72\")   Wt 83 kg (183 lb)   SpO2 96%   BMI 24.82 kg/m² .  Objective     Vitals reviewed.   Constitutional:       General: Not in acute distress.     Appearance: Normal appearance. Well-developed.   Eyes:      Pupils: Pupils are equal, round, and reactive to light.   HENT:      Head: Normocephalic and atraumatic.   Neck:      Vascular: No JVD.   Pulmonary:      Effort: Pulmonary effort is normal.      Breath sounds: Normal breath sounds.   Cardiovascular:      Normal rate. Regular rhythm.   Edema:     Peripheral edema absent.   Abdominal:      General: There is no distension.      Palpations: Abdomen is soft.      Tenderness: There is no abdominal tenderness.   Musculoskeletal: Normal range of motion.      Cervical back: Normal range of motion and neck supple. Skin:     General: Skin is warm and dry.   Neurological:      Mental Status: Alert and oriented to person, place, and time.             ECG 12 Lead    Date/Time: 6/10/2025 2:03 PM  Performed by: Amy Bill APRN    Authorized by: Amy Bill APRN  Comparison: compared with " previous ECG   Similar to previous ECG  Rhythm: sinus rhythm  Rate: normal  BPM: 69  Conduction: conduction normal  ST Segments: ST segments normal  T Waves: T waves normal  QRS axis: normal          EKG ordered by and reviewed by me in office

## 2025-07-18 ENCOUNTER — HOSPITAL ENCOUNTER (OUTPATIENT)
Dept: CARDIOLOGY | Facility: HOSPITAL | Age: 60
Discharge: HOME | End: 2025-07-18
Admitting: NURSE PRACTITIONER
Payer: COMMERCIAL

## 2025-07-18 VITALS — DIASTOLIC BLOOD PRESSURE: 77 MMHG | BODY MASS INDEX: 24.82 KG/M2 | SYSTOLIC BLOOD PRESSURE: 126 MMHG | WEIGHT: 183 LBS

## 2025-07-18 DIAGNOSIS — I34.0 MITRAL VALVE INSUFFICIENCY, UNSPECIFIED ETIOLOGY: ICD-10-CM

## 2025-07-18 LAB
AORTIC DIMENSIONLESS INDEX: 0.51 (DI)
AV MEAN PRESS GRAD SYS DOP V1V2: 5.8 MMHG
AV VMAX SYS DOP: 165.9 CM/SEC
BH CV ECHO LEFT VENTRICLE GLOBAL LONGITUDINAL STRAIN: -20.5 %
BH CV ECHO MEAS - ACS: 1.95 CM
BH CV ECHO MEAS - AI P1/2T: 541 MSEC
BH CV ECHO MEAS - AO MAX PG: 11 MMHG
BH CV ECHO MEAS - AO V2 VTI: 35 CM
BH CV ECHO MEAS - AVA(I,D): 0.65 CM2
BH CV ECHO MEAS - EDV(CUBED): 121.2 ML
BH CV ECHO MEAS - EDV(MOD-SP4): 123 ML
BH CV ECHO MEAS - EF(MOD-SP4): 62.5 %
BH CV ECHO MEAS - ESV(CUBED): 38.7 ML
BH CV ECHO MEAS - ESV(MOD-SP4): 46.1 ML
BH CV ECHO MEAS - FS: 31.7 %
BH CV ECHO MEAS - IVS/LVPW: 1.08 CM
BH CV ECHO MEAS - IVSD: 0.94 CM
BH CV ECHO MEAS - LA DIMENSION: 3.4 CM
BH CV ECHO MEAS - LAT PEAK E' VEL: 17.5 CM/SEC
BH CV ECHO MEAS - LV DIASTOLIC VOL/BSA (35-75): 60 CM2
BH CV ECHO MEAS - LV MASS(C)D: 157 GRAMS
BH CV ECHO MEAS - LV MAX PG: 3 MMHG
BH CV ECHO MEAS - LV MEAN PG: 1.62 MMHG
BH CV ECHO MEAS - LV SYSTOLIC VOL/BSA (12-30): 22.5 CM2
BH CV ECHO MEAS - LV V1 MAX: 87 CM/SEC
BH CV ECHO MEAS - LV V1 VTI: 17.7 CM
BH CV ECHO MEAS - LVIDD: 4.9 CM
BH CV ECHO MEAS - LVIDS: 3.4 CM
BH CV ECHO MEAS - LVOT AREA: 1.28 CM2
BH CV ECHO MEAS - LVOT DIAM: 1.28 CM
BH CV ECHO MEAS - LVPWD: 0.87 CM
BH CV ECHO MEAS - MED PEAK E' VEL: 7.5 CM/SEC
BH CV ECHO MEAS - MV A MAX VEL: 64.5 CM/SEC
BH CV ECHO MEAS - MV DEC SLOPE: 324.8 CM/SEC2
BH CV ECHO MEAS - MV DEC TIME: 0.16 SEC
BH CV ECHO MEAS - MV E MAX VEL: 101.7 CM/SEC
BH CV ECHO MEAS - MV E/A: 1.58
BH CV ECHO MEAS - MV MAX PG: 4.8 MMHG
BH CV ECHO MEAS - MV MEAN PG: 1.7 MMHG
BH CV ECHO MEAS - MV P1/2T: 106.7 MSEC
BH CV ECHO MEAS - MV V2 VTI: 29.9 CM
BH CV ECHO MEAS - MVA(P1/2T): 2.06 CM2
BH CV ECHO MEAS - MVA(VTI): 0.76 CM2
BH CV ECHO MEAS - PA ACC TIME: 0.09 SEC
BH CV ECHO MEAS - PA V2 MAX: 139.7 CM/SEC
BH CV ECHO MEAS - PULM A REVS DUR: 0.13 SEC
BH CV ECHO MEAS - PULM A REVS VEL: 38.6 CM/SEC
BH CV ECHO MEAS - PULM DIAS VEL: 57 CM/SEC
BH CV ECHO MEAS - PULM S/D: 1.08
BH CV ECHO MEAS - PULM SYS VEL: 61.4 CM/SEC
BH CV ECHO MEAS - RV MAX PG: 1.83 MMHG
BH CV ECHO MEAS - RV V1 MAX: 67.6 CM/SEC
BH CV ECHO MEAS - RV V1 VTI: 12.1 CM
BH CV ECHO MEAS - RVDD: 3.5 CM
BH CV ECHO MEAS - SV(LVOT): 22.7 ML
BH CV ECHO MEAS - SV(MOD-SP4): 76.9 ML
BH CV ECHO MEAS - SVI(LVOT): 11.1 ML/M2
BH CV ECHO MEAS - SVI(MOD-SP4): 37.5 ML/M2
BH CV ECHO MEAS - TAPSE (>1.6): 1.83 CM
BH CV ECHO MEASUREMENTS AVERAGE E/E' RATIO: 8.14
BH CV XLRA - TDI S': 10.8 CM/SEC
LV EF 3D SEGMENTATION: 64 %
LV EF BIPLANE MOD: 63 %
SINUS: 3.6 CM
STJ: 3.2 CM

## 2025-07-18 PROCEDURE — 93306 TTE W/DOPPLER COMPLETE: CPT

## 2025-07-18 PROCEDURE — 93356 MYOCRD STRAIN IMG SPCKL TRCK: CPT
